# Patient Record
Sex: FEMALE | Race: BLACK OR AFRICAN AMERICAN | NOT HISPANIC OR LATINO | Employment: PART TIME | URBAN - METROPOLITAN AREA
[De-identification: names, ages, dates, MRNs, and addresses within clinical notes are randomized per-mention and may not be internally consistent; named-entity substitution may affect disease eponyms.]

---

## 2017-03-01 ENCOUNTER — OFFICE VISIT (OUTPATIENT)
Dept: OBSTETRICS AND GYNECOLOGY | Facility: CLINIC | Age: 22
End: 2017-03-01
Payer: MEDICAID

## 2017-03-01 VITALS
RESPIRATION RATE: 13 BRPM | BODY MASS INDEX: 25.84 KG/M2 | SYSTOLIC BLOOD PRESSURE: 114 MMHG | HEIGHT: 64 IN | WEIGHT: 151.38 LBS | HEART RATE: 82 BPM | DIASTOLIC BLOOD PRESSURE: 73 MMHG

## 2017-03-01 DIAGNOSIS — Z01.419 WELL WOMAN EXAM WITH ROUTINE GYNECOLOGICAL EXAM: Primary | ICD-10-CM

## 2017-03-01 DIAGNOSIS — Z11.3 SCREEN FOR STD (SEXUALLY TRANSMITTED DISEASE): ICD-10-CM

## 2017-03-01 DIAGNOSIS — R53.83 FATIGUE, UNSPECIFIED TYPE: ICD-10-CM

## 2017-03-01 DIAGNOSIS — Z30.431 IUD SURVEILLANCE: ICD-10-CM

## 2017-03-01 PROCEDURE — 87591 N.GONORRHOEAE DNA AMP PROB: CPT

## 2017-03-01 PROCEDURE — 99999 PR PBB SHADOW E&M-EST. PATIENT-LVL III: CPT | Mod: PBBFAC,,, | Performed by: OBSTETRICS & GYNECOLOGY

## 2017-03-01 PROCEDURE — 99213 OFFICE O/P EST LOW 20 MIN: CPT | Mod: PBBFAC | Performed by: OBSTETRICS & GYNECOLOGY

## 2017-03-01 PROCEDURE — 99395 PREV VISIT EST AGE 18-39: CPT | Mod: S$PBB,,, | Performed by: OBSTETRICS & GYNECOLOGY

## 2017-03-01 NOTE — PROGRESS NOTES
Subjective:    Patient ID: Hailey Menjivar is a 21 y.o. y.o. female.     Chief Complaint: Annual Well Woman Exam     History of Present Illness:  Hailey presents today for Annual Well Woman exam. She describes her menses as rare and light with Mirena IUD x 1 year.She denies pelvic pain.  She denies breast tenderness, masses, nipple discharge. She denies difficulty with urination or bowel movements. She denies vaginal discharge or irritation, She denies bloating, early satiety, or weight changes. She is sexually active. Contraception is by IUD.  She does report increasing fatigue and hair loss.      Menstrual History:   No LMP recorded. Patient has had an implant..     OB History      Para Term  AB TAB SAB Ectopic Multiple Living    0 0 0 0 0 0 0 0 0 0          The following portions of the patient's history were reviewed and updated as appropriate: allergies, current medications, past family history, past medical history, past social history, past surgical history and problem list.    ROS:   CONSTITUTIONAL: fatigue, Negative for fever, chills, diaphoresis, weakness, weight loss, weight gain  ENT: negative for sore throat, nasal congestion, nasal discharge, epistaxis, tinnitus, hearing loss  EYES: negative for blurry vision, decreased vision, loss of vision, eye pain, diplopia, photophobia, discharge  SKIN: Negative for rash, itching, hives  RESPIRATORY: negative for cough, hemoptysis, shortness of breath, pleuritic chest pain, wheezing  CARDIOVASCULAR: negative for chest pain, dyspnea on exertion, orthopnea, paroxysmal nocturnal dyspnea, edema, palpitations  BREAST: negative for breast  tenderness, breast mass, nipple discharge, or skin changes  GASTROINTESTINAL: negative for abdominal pain, flank pain, nausea, vomiting, diarrhea, constipation, black stool, blood in stool  GENITOURINARY: negative for abnormal vaginal bleeding, amenorrhea, decreased libido, dysuria, genital sores, hematuria,  incontinence, menorrhagia, pelvic pain, urinary frequency, vaginal discharge  HEMATOLOGIC/LYMPHATIC: negative for swollen lymph nodes, bleeding, bruising  MUSCULOSKELETAL: negative for back pain, joint pain, joint stiffness, joint swelling, muscle pain, muscle weakness  NEUROLOGICAL: negative for dizzy/vertigo, headache, focal weakness, numbness/tingling, speech problems, loss of consciousness, confusion, memory loss  BEHAVORIAL/PSYCH: negative for anxiety, depression, psychosis  ENDOCRINE: negative for polydipsia/polyuria, palpitations, skin changes, temperature intolerance, unexpected weight changes  ALLERGIC/IMMUNOLOGIC: negative for urticaria, hay fever, angioedema      Objective:    Vital Signs:  Vitals:    03/01/17 1505   BP: 114/73   Pulse: 82   Resp: 13       Physical Exam:  General:  alert, cooperative, appears stated age, no distress   Skin:  Skin color, texture, turgor normal. No rashes or lesions   HEENT:  extra ocular movement intact, sclera clear, anicteric   Neck: supple, trachea midline, no adenopathy or thyromegally   Respiratory:  Normal effort   Breasts:  no discharge, erythema, or tenderness, no skin dimpling or peau d'orange   Abdomen:  soft, nontender, no palpable masses   Pelvis: External genitalia: normal general appearance  Urinary system: urethral meatus normal, bladder nontender  Vaginal: normal mucosa without prolapse or lesions  Cervix: normal appearance, IUD string visualized  Uterus: normal size, shape, position  Adnexa: normal size, nontender bilaterally   Extremities: Normal ROM; no edema, no cyanosis   Neurologial: Normal strength and tone. No focal numbness or weakness   Psychiatric: normal mood, speech, dress, and thought processes         Assessment:       Healthy female exam.     1. Well woman exam with routine gynecological exam    2. Screen for STD (sexually transmitted disease)    3. IUD surveillance    4. Fatigue, unspecified type          Plan:      1. Well woman exam with  routine gynecological exam    2. Screen for STD (sexually transmitted disease)  - C. trachomatis/N. gonorrhoeae by AMP DNA Cervix    3. IUD surveillance  Doing well with IUD; strings visible    4. Fatigue, unspecified type  - TSH; Future      COUNSELING:  Hailey was counseled on STD pevention, use and side-effects of various contraceptive measures, A.C.O.G. Pap guidelines and recommendations for yearly pelvic exams in addition to recommendations for monthly self breast exams; to see her PCP for other health maintenance.

## 2017-03-01 NOTE — MR AVS SNAPSHOT
[facility]  [address] [place] [id number]  Phone: [phone]                  Hailey Menjivar   3/1/2017 3:00 PM   Office Visit    Description:  Female : 1995   Provider:  Harris Mcrae MD   Department:  [facility]           Reason for Visit     Annual Exam     IUD Check           Diagnoses this Visit        Comments    Well woman exam with routine gynecological exam    -  Primary     Screen for STD (sexually transmitted disease)         IUD surveillance         Fatigue, unspecified type                To Do List           Goals (5 Years of Data)     None      Follow-Up and Disposition     Return in about 1 year (around 3/1/2018).      [facility]     [facility] On Call Nurse Care Line -  Assistance  Registered nurses in the [facility] On Call Center provide clinical advisement, health education, appointment booking, and other advisory services.  Call for this free service at [phone].             Medications           Message regarding Medications     Verify the changes and/or additions to your medication regime listed below are the same as discussed with your clinician today.  If any of these changes or additions are incorrect, please notify your healthcare provider.             Verify that the below list of medications is an accurate representation of the medications you are currently taking.  If none reported, the list may be blank. If incorrect, please contact your healthcare provider. Carry this list with you in case of emergency.           Current Medications     levonorgestrel (MIRENA) 20 mcg/24 hr (5 years) IUD 1 each by Intrauterine route once.           Clinical Reference Information           Your Vitals Were     BP                   114/73           Blood Pressure          Most Recent Value    BP  114/73      Allergies as of 3/1/2017     No Known Allergies      Immunizations Administered on Date of Encounter - 3/1/2017     None      Orders  Placed During Today's Visit      Normal Orders This Visit    C. trachomatis/N. gonorrhoeae by AMP DNA Cervix     Future Labs/Procedures Expected by Expires    TSH  3/1/2017 5/30/2017      Language Assistance Services     ATTENTION: Language assistance services are available, free of charge. Please call [phone].      ATENCIÓN: Si habla melania, tiene a farris disposición servicios gratuitos de asistencia lingüística. Llame al [phone].     CHÚ Ý: N?u b?n nói Ti?ng Vi?t, có các d?ch v? h? tr? ngôn ng? mi?n phí dành cho b?n. G?i s? [phone].         [facility] complies with applicable Federal civil rights laws and does not discriminate on the basis of race, color, national origin, age, disability, or sex.

## 2017-03-06 LAB
C TRACH DNA SPEC QL NAA+PROBE: NEGATIVE
N GONORRHOEA DNA SPEC QL NAA+PROBE: NEGATIVE

## 2018-07-27 ENCOUNTER — OFFICE VISIT (OUTPATIENT)
Dept: OBSTETRICS AND GYNECOLOGY | Facility: CLINIC | Age: 23
End: 2018-07-27
Payer: MEDICAID

## 2018-07-27 VITALS
DIASTOLIC BLOOD PRESSURE: 76 MMHG | WEIGHT: 150.44 LBS | RESPIRATION RATE: 13 BRPM | BODY MASS INDEX: 25.68 KG/M2 | HEIGHT: 64 IN | HEART RATE: 81 BPM | SYSTOLIC BLOOD PRESSURE: 122 MMHG

## 2018-07-27 DIAGNOSIS — N76.0 VAGINOSIS: Primary | ICD-10-CM

## 2018-07-27 DIAGNOSIS — Z11.3 SCREEN FOR STD (SEXUALLY TRANSMITTED DISEASE): ICD-10-CM

## 2018-07-27 DIAGNOSIS — Z30.431 IUD CHECK UP: ICD-10-CM

## 2018-07-27 DIAGNOSIS — N64.4 BREAST TENDERNESS IN FEMALE: ICD-10-CM

## 2018-07-27 DIAGNOSIS — Z12.39 ENCOUNTER FOR SCREENING BREAST EXAMINATION: ICD-10-CM

## 2018-07-27 PROCEDURE — 87510 GARDNER VAG DNA DIR PROBE: CPT

## 2018-07-27 PROCEDURE — 99213 OFFICE O/P EST LOW 20 MIN: CPT | Mod: PBBFAC | Performed by: OBSTETRICS & GYNECOLOGY

## 2018-07-27 PROCEDURE — 87480 CANDIDA DNA DIR PROBE: CPT

## 2018-07-27 PROCEDURE — 87491 CHLMYD TRACH DNA AMP PROBE: CPT

## 2018-07-27 PROCEDURE — 99999 PR PBB SHADOW E&M-EST. PATIENT-LVL III: CPT | Mod: PBBFAC,,, | Performed by: OBSTETRICS & GYNECOLOGY

## 2018-07-27 PROCEDURE — 99213 OFFICE O/P EST LOW 20 MIN: CPT | Mod: S$PBB,,, | Performed by: OBSTETRICS & GYNECOLOGY

## 2018-07-27 NOTE — PROGRESS NOTES
Subjective:    Patient ID: Hailey Menjivar is a 22 y.o. y.o. female.     Chief Complaint:   Chief Complaint   Patient presents with    Vaginal Discharge    STD CHECK    IUD Check     pt states her partner is feeling the strings durning intercourse    Breast Pain       History of Present Illness   Hailey presents today complaining of intermittent discharge and odor. Symptoms began about 1 week ago and have off and on.  No LMP recorded. Patient has had an implant.. She also denies abdominal pain. She is sexually active. She requests GC/CT testing.  She says that her partner has complained of pain with the IUD strings.   She also complains of daily bilateral breast tenderness. No masses, nipple discharge, or skin changes.     ROS:   CONSTITUTIONAL: Negative for fever, chills, diaphoresis, weakness, fatigue, weight loss, weight gain  GASTROINTESTINAL: negative for abdominal pain, flank pain, nausea, vomiting, diarrhea, constipation, black stool, blood in stool  BREAST: bilateral breast tenderness, and negative for breast mass, nipple discharge, or skin changes  GENITOURINARY: genital discharge, negative for dysuria, frequency/urgency, hematuria, vaginal bleeding, irregular menses, heavy menses, pelvic pain  HEMATOLOGIC/LYMPHATIC: negative for swollen lymph nodes, bleeding, bruising  MUSCULOSKELETAL: negative for back pain, joint pain, joint stiffness, joint swelling, muscle pain, muscle weakness  ENDOCRINE: negative for polydipsia/polyuria, palpitations, skin changes, temperature intolerance, unexpected weight changes      Objective:    Vital Signs:  Vitals:    07/27/18 1151   BP: 122/76   Pulse: 81   Resp: 13       Physical Exam:  General:  alert, cooperative, no distress   Neuro/Psych: AAOx3, appropriate mood and affect   Head: Normocephalic, atraumatic   Neck: Supple, normal ROM   Resp: Normal effort   Breast: No palpable lesions, normal nipples without discharge, no LAD   Skin:  Skin color, texture, turgor normal.  No rashes or lesions   Abdomen:  soft, NT   Pelvis: External genitalia: normal general appearance  Urinary system: urethral meatus normal, bladder nontender  Vaginal: normal mucosa without prolapse or lesions, discharge, white and scant  Cervix: normal appearance, IUD string visualized, strings trimmed  Uterus: normal size, shape, position  Adnexa: normal size, nontender bilaterally         Assessment:      1. Vaginosis    2. Screen for STD (sexually transmitted disease)    3. IUD check up    4. Encounter for screening breast examination    5. Breast tenderness in female          Plan:      Vaginosis  -     C. trachomatis/N. gonorrhoeae by AMP DNA [facility]; Cervix  -     Vaginosis Screen by DNA Probe    Screen for STD (sexually transmitted disease)  -     C. trachomatis/N. gonorrhoeae by AMP DNA [facility]; Cervix  -     Vaginosis Screen by DNA Probe    IUD check up    Encounter for screening breast examination    Breast tenderness in female      IUD strings trimmed slightly, still about 1.5 cm long from os.  Benign breast exam discussed. Also discussed concerning findings to look for on self breast exam.

## 2018-07-28 LAB
CANDIDA RRNA VAG QL PROBE: NEGATIVE
G VAGINALIS RRNA GENITAL QL PROBE: POSITIVE
T VAGINALIS RRNA GENITAL QL PROBE: NEGATIVE

## 2018-07-29 LAB
C TRACH DNA SPEC QL NAA+PROBE: NOT DETECTED
N GONORRHOEA DNA SPEC QL NAA+PROBE: NOT DETECTED

## 2018-07-30 RX ORDER — METRONIDAZOLE 500 MG/1
500 TABLET ORAL 2 TIMES DAILY
Qty: 14 TABLET | Refills: 0 | Status: SHIPPED | OUTPATIENT
Start: 2018-07-30 | End: 2018-08-06

## 2019-06-26 ENCOUNTER — TELEPHONE (OUTPATIENT)
Dept: OBSTETRICS AND GYNECOLOGY | Facility: CLINIC | Age: 24
End: 2019-06-26

## 2019-06-26 ENCOUNTER — OFFICE VISIT (OUTPATIENT)
Dept: OBSTETRICS AND GYNECOLOGY | Facility: CLINIC | Age: 24
End: 2019-06-26
Payer: MEDICAID

## 2019-06-26 VITALS
BODY MASS INDEX: 23.11 KG/M2 | HEIGHT: 64 IN | HEART RATE: 71 BPM | WEIGHT: 135.38 LBS | DIASTOLIC BLOOD PRESSURE: 66 MMHG | SYSTOLIC BLOOD PRESSURE: 114 MMHG | RESPIRATION RATE: 13 BRPM

## 2019-06-26 DIAGNOSIS — Z12.4 CERVICAL CANCER SCREENING: ICD-10-CM

## 2019-06-26 DIAGNOSIS — Z30.011 ENCOUNTER FOR INITIAL PRESCRIPTION OF CONTRACEPTIVE PILLS: ICD-10-CM

## 2019-06-26 DIAGNOSIS — Z30.432 ENCOUNTER FOR IUD REMOVAL: ICD-10-CM

## 2019-06-26 DIAGNOSIS — K92.1 FRANK BLOOD IN STOOL: ICD-10-CM

## 2019-06-26 DIAGNOSIS — Z01.419 WELL WOMAN EXAM WITH ROUTINE GYNECOLOGICAL EXAM: Primary | ICD-10-CM

## 2019-06-26 PROCEDURE — 58301 REMOVE INTRAUTERINE DEVICE: CPT | Mod: S$PBB,,, | Performed by: OBSTETRICS & GYNECOLOGY

## 2019-06-26 PROCEDURE — 99395 PR PREVENTIVE VISIT,EST,18-39: ICD-10-PCS | Mod: 25,S$PBB,, | Performed by: OBSTETRICS & GYNECOLOGY

## 2019-06-26 PROCEDURE — 58301 REMOVE INTRAUTERINE DEVICE: CPT | Mod: PBBFAC | Performed by: OBSTETRICS & GYNECOLOGY

## 2019-06-26 PROCEDURE — 87624 HPV HI-RISK TYP POOLED RSLT: CPT

## 2019-06-26 PROCEDURE — 88141 LIQUID-BASED PAP SMEAR, SCREENING: ICD-10-PCS | Mod: ,,, | Performed by: PATHOLOGY

## 2019-06-26 PROCEDURE — 99395 PREV VISIT EST AGE 18-39: CPT | Mod: 25,S$PBB,, | Performed by: OBSTETRICS & GYNECOLOGY

## 2019-06-26 PROCEDURE — 88141 CYTOPATH C/V INTERPRET: CPT | Mod: ,,, | Performed by: PATHOLOGY

## 2019-06-26 PROCEDURE — 99999 PR PBB SHADOW E&M-EST. PATIENT-LVL III: CPT | Mod: PBBFAC,,, | Performed by: OBSTETRICS & GYNECOLOGY

## 2019-06-26 PROCEDURE — 88142 CYTOPATH C/V THIN LAYER: CPT | Performed by: PATHOLOGY

## 2019-06-26 PROCEDURE — 99999 PR PBB SHADOW E&M-EST. PATIENT-LVL III: ICD-10-PCS | Mod: PBBFAC,,, | Performed by: OBSTETRICS & GYNECOLOGY

## 2019-06-26 PROCEDURE — 58301 PR REMOVE, INTRAUTERINE DEVICE: ICD-10-PCS | Mod: S$PBB,,, | Performed by: OBSTETRICS & GYNECOLOGY

## 2019-06-26 PROCEDURE — 99213 OFFICE O/P EST LOW 20 MIN: CPT | Mod: PBBFAC | Performed by: OBSTETRICS & GYNECOLOGY

## 2019-06-26 RX ORDER — NORGESTIMATE AND ETHINYL ESTRADIOL 0.25-0.035
1 KIT ORAL DAILY
Qty: 28 TABLET | Refills: 11 | Status: SHIPPED | OUTPATIENT
Start: 2019-06-26 | End: 2019-08-15

## 2019-06-26 NOTE — PROGRESS NOTES
Subjective:    Patient ID: Hailey Menjivar is a 23 y.o. y.o. female.     Chief Complaint: Annual Well Woman Exam     History of Present Illness:  Hailey presents today for Annual Well Woman exam. She describes her menses as irregular with Mirena.  She has had the Mirena for 4 years, but is interested in alternative birth control.She denies pelvic pain.  She denies breast masses, nipple discharge. She denies difficulty with urination.  She reports that she has bleeding with almost every other BM.  This has been occurring for about 1-2 months.  She denies GYN lesions. She denies bloating, early satiety, or weight changes. She is sexually active. Contraception is by IUD; desires removal today.  Would like to start combination OCPs.      Menstrual History:   Patient's last menstrual period was 2019..     OB History    :  0  Para:  0  Term:  0  :  0  :  0  Livin  Spontaneous :  0  Therapeutic :  0  Ectopic:  0  Multiple:  0            The following portions of the patient's history were reviewed and updated as appropriate: allergies, current medications, past family history, past medical history, past social history, past surgical history and problem list.    ROS:   CONSTITUTIONAL: Negative for fever, chills, diaphoresis, weakness, fatigue, weight loss, weight gain  ENT: negative for sore throat, nasal congestion, nasal discharge, epistaxis, tinnitus, hearing loss  EYES: negative for blurry vision, decreased vision, loss of vision, eye pain, diplopia, photophobia, discharge  SKIN: Negative for rash, itching, hives  RESPIRATORY: negative for cough, hemoptysis, shortness of breath, pleuritic chest pain, wheezing  CARDIOVASCULAR: negative for chest pain, dyspnea on exertion, orthopnea, paroxysmal nocturnal dyspnea, edema, palpitations  BREAST: breast  tenderness, and negative for breast mass, nipple discharge, or skin changes  GASTROINTESTINAL: nausea, blood in stool, Denies:  abdominal pain, flank pain, vomiting, constipation  GENITOURINARY: irregular menses, Denies: dysuria, frequency/urgency, hematuria, genital discharge, heavy menses, pelvic pain  HEMATOLOGIC/LYMPHATIC: negative for swollen lymph nodes, bleeding, bruising  MUSCULOSKELETAL: negative for back pain, joint pain, joint stiffness, joint swelling, muscle pain, muscle weakness  NEUROLOGICAL: negative for dizzy/vertigo, headache, focal weakness, numbness/tingling, speech problems, loss of consciousness, confusion, memory loss  BEHAVORIAL/PSYCH: negative for anxiety, depression, psychosis  ENDOCRINE: negative for polydipsia/polyuria, palpitations, skin changes, temperature intolerance, unexpected weight changes  ALLERGIC/IMMUNOLOGIC: negative for urticaria, hay fever, angioedema      Objective:    Vital Signs:  Vitals:    06/26/19 1223   BP: 114/66   Pulse: 71   Resp: 13       Physical Exam:  General:  alert, cooperative, no distress   Skin:  Skin color, texture, turgor normal. No rashes or lesions   HEENT:  extra ocular movement intact, sclera clear, anicteric   Neck: supple, trachea midline, no adenopathy or thyromegally   Respiratory:  Normal effort   Breasts:  symmetric fibrous changes in both upper outer quadrants, no discharge, erythema, tenderness, or palpable masses; no axillary lymphadenopathy   Abdomen:  soft, nontender, no palpable masses   Pelvis: External genitalia: normal general appearance  Urinary system: urethral meatus normal, bladder nontender  Vaginal: normal mucosa without prolapse or lesions  Cervix: normal appearance  Uterus: normal size, shape, position  Adnexa: normal size, nontender bilaterally   Extremities: Normal ROM; no edema, no cyanosis   Neurologial: Normal strength and tone. No focal numbness or weakness.   Psychiatric: normal mood, speech, dress, and thought processes         PROCEDURE:  TIME OUT PERFORMED.  The cervix visualized with a speculum.  The IUD string was NOT visualized.  The string  was not able to be retrieved with the cytobrush.  The cervix was cleaned with betadine.  The IUD hook was used and the string was brought down. The string was grasped with a sponge forceps. With gently traction the IUD was removed without difficulty.          Assessment:       Healthy female exam.     1. Well woman exam with routine gynecological exam    2. Cervical cancer screening    3. Encounter for IUD removal    4. Encounter for initial prescription of contraceptive pills    5. Helio blood in stool          Plan:      Well woman exam with routine gynecological exam    Cervical cancer screening  -     Liquid-based pap smear, screening    Encounter for IUD removal    Encounter for initial prescription of contraceptive pills  -     norgestimate-ethinyl estradiol (ORTHO-CYCLEN) 0.25-35 mg-mcg per tablet; Take 1 tablet by mouth once daily.  Dispense: 28 tablet; Refill: 11    Helio blood in stool  -     Ambulatory consult to Gastroenterology      Manage post IUD removal pain with NSAIDs, Tylenol or Rx given.  Report heavy bleeding, fever, continued pain.  Use alternative birth control until one normal menstrual cycle.    Counseling lasted approximately 15 minutes and all her questions were answered.          COUNSELING:  Hailey was counseled on use and side-effects of various contraceptive measures, A.C.O.G. Pap guidelines and recommendations for yearly pelvic exams in addition to recommendations for monthly self breast exams; to see her PCP for other health maintenance.

## 2019-06-26 NOTE — TELEPHONE ENCOUNTER
[facility] Gastroenterology Dept to review pt file and call to schedule. The doctors are Dr Almita Carrizales, Gaudencio Pearce, and Ge Sahni. Message has been sent to the nurses.

## 2019-06-27 NOTE — TELEPHONE ENCOUNTER
Nurse Angelique Benz returned call. States that Dr will review file and they will call pt to schedule.

## 2019-07-18 LAB
HPV HR 12 DNA CVX QL NAA+PROBE: POSITIVE
HPV16 AG SPEC QL: NEGATIVE
HPV18 DNA SPEC QL NAA+PROBE: NEGATIVE

## 2019-07-22 ENCOUNTER — TELEPHONE (OUTPATIENT)
Dept: OBSTETRICS AND GYNECOLOGY | Facility: CLINIC | Age: 24
End: 2019-07-22

## 2019-07-22 NOTE — TELEPHONE ENCOUNTER
Pt called to discuss results of pap smear results. Dr. Mcrae discussed all pap smear results with patient, also discussed HPV vaccine. Pt instructed to repeat pap smear in 1 year and voiced understanding. Will check to see if she received Gardasil and contact clinic.

## 2019-07-22 NOTE — TELEPHONE ENCOUNTER
----- Message from Gladis Yousif sent at 7/22/2019  2:07 PM CDT -----  Contact: self      ----- Message -----  From: Cecilia Bañuelos MA  Sent: 7/22/2019   1:16 PM  To: Thor MANZO Staff    Hailey Menjivar  MRN: [id number]  Home Phone      [phone]  Work Phone      Not on file.  Mobile          [phone]    Patient Care Team:  Ruperto Willingham MD as PCP - General (Pediatrics)  Harris Mcrae MD as Obstetrician (Obstetrics and Gynecology)  OB? No  What phone number can you be reached at?   [phone]  Message:   Has questions regarding lab work.

## 2019-08-08 ENCOUNTER — OFFICE VISIT (OUTPATIENT)
Dept: OBSTETRICS AND GYNECOLOGY | Facility: CLINIC | Age: 24
End: 2019-08-08
Payer: MEDICAID

## 2019-08-08 ENCOUNTER — LAB VISIT (OUTPATIENT)
Dept: LAB | Facility: HOSPITAL | Age: 24
End: 2019-08-08
Attending: SPECIALIST
Payer: MEDICAID

## 2019-08-08 VITALS
HEIGHT: 64 IN | WEIGHT: 131.81 LBS | BODY MASS INDEX: 22.5 KG/M2 | DIASTOLIC BLOOD PRESSURE: 64 MMHG | SYSTOLIC BLOOD PRESSURE: 118 MMHG

## 2019-08-08 DIAGNOSIS — Z11.3 SCREEN FOR STD (SEXUALLY TRANSMITTED DISEASE): ICD-10-CM

## 2019-08-08 DIAGNOSIS — N91.0 DELAYED MENSES: ICD-10-CM

## 2019-08-08 DIAGNOSIS — Z34.90 EARLY STAGE OF PREGNANCY: ICD-10-CM

## 2019-08-08 DIAGNOSIS — Z12.4 ENCOUNTER FOR PAP SMEAR OF CERVIX WITH HPV DNA COTESTING: Primary | ICD-10-CM

## 2019-08-08 LAB
ABO + RH BLD: NORMAL
B-HCG UR QL: POSITIVE
BASOPHILS # BLD AUTO: 0.05 K/UL (ref 0–0.2)
BASOPHILS NFR BLD: 0.6 % (ref 0–1.9)
BLD GP AB SCN CELLS X3 SERPL QL: NORMAL
CTP QC/QA: YES
DIFFERENTIAL METHOD: NORMAL
EOSINOPHIL # BLD AUTO: 0 K/UL (ref 0–0.5)
EOSINOPHIL NFR BLD: 0.5 % (ref 0–8)
ERYTHROCYTE [DISTWIDTH] IN BLOOD BY AUTOMATED COUNT: 13.2 % (ref 11.5–14.5)
HCT VFR BLD AUTO: 37.1 % (ref 37–48.5)
HGB BLD-MCNC: 12.2 G/DL (ref 12–16)
IMM GRANULOCYTES # BLD AUTO: 0.02 K/UL (ref 0–0.04)
IMM GRANULOCYTES NFR BLD AUTO: 0.2 % (ref 0–0.5)
LYMPHOCYTES # BLD AUTO: 1.6 K/UL (ref 1–4.8)
LYMPHOCYTES NFR BLD: 19.8 % (ref 18–48)
MCH RBC QN AUTO: 28.5 PG (ref 27–31)
MCHC RBC AUTO-ENTMCNC: 32.9 G/DL (ref 32–36)
MCV RBC AUTO: 87 FL (ref 82–98)
MONOCYTES # BLD AUTO: 0.8 K/UL (ref 0.3–1)
MONOCYTES NFR BLD: 9.5 % (ref 4–15)
NEUTROPHILS # BLD AUTO: 5.6 K/UL (ref 1.8–7.7)
NEUTROPHILS NFR BLD: 69.4 % (ref 38–73)
NRBC BLD-RTO: 0 /100 WBC
PLATELET # BLD AUTO: 207 K/UL (ref 150–350)
PMV BLD AUTO: 10.7 FL (ref 9.2–12.9)
RBC # BLD AUTO: 4.28 M/UL (ref 4–5.4)
TSH SERPL DL<=0.005 MIU/L-ACNC: 1.55 UIU/ML (ref 0.4–4)
WBC # BLD AUTO: 8.02 K/UL (ref 3.9–12.7)

## 2019-08-08 PROCEDURE — 86901 BLOOD TYPING SEROLOGIC RH(D): CPT

## 2019-08-08 PROCEDURE — 99205 PR OFFICE/OUTPT VISIT, NEW, LEVL V, 60-74 MIN: ICD-10-PCS | Mod: TH,S$PBB,, | Performed by: SPECIALIST

## 2019-08-08 PROCEDURE — 84443 ASSAY THYROID STIM HORMONE: CPT

## 2019-08-08 PROCEDURE — 36415 COLL VENOUS BLD VENIPUNCTURE: CPT | Mod: PO

## 2019-08-08 PROCEDURE — 99999 PR PBB SHADOW E&M-EST. PATIENT-LVL III: ICD-10-PCS | Mod: PBBFAC,,, | Performed by: SPECIALIST

## 2019-08-08 PROCEDURE — 86703 HIV-1/HIV-2 1 RESULT ANTBDY: CPT

## 2019-08-08 PROCEDURE — 88175 CYTOPATH C/V AUTO FLUID REDO: CPT

## 2019-08-08 PROCEDURE — 85025 COMPLETE CBC W/AUTO DIFF WBC: CPT

## 2019-08-08 PROCEDURE — 87340 HEPATITIS B SURFACE AG IA: CPT

## 2019-08-08 PROCEDURE — 99213 OFFICE O/P EST LOW 20 MIN: CPT | Mod: PBBFAC,TH,PN | Performed by: SPECIALIST

## 2019-08-08 PROCEDURE — 86592 SYPHILIS TEST NON-TREP QUAL: CPT

## 2019-08-08 PROCEDURE — 86762 RUBELLA ANTIBODY: CPT

## 2019-08-08 PROCEDURE — 87491 CHLMYD TRACH DNA AMP PROBE: CPT

## 2019-08-08 PROCEDURE — 99205 OFFICE O/P NEW HI 60 MIN: CPT | Mod: TH,S$PBB,, | Performed by: SPECIALIST

## 2019-08-08 PROCEDURE — 81025 URINE PREGNANCY TEST: CPT | Mod: PBBFAC,PN | Performed by: SPECIALIST

## 2019-08-08 PROCEDURE — 99999 PR PBB SHADOW E&M-EST. PATIENT-LVL III: CPT | Mod: PBBFAC,,, | Performed by: SPECIALIST

## 2019-08-08 PROCEDURE — 81220 CFTR GENE COM VARIANTS: CPT

## 2019-08-08 NOTE — PROGRESS NOTES
22 yo BF G1 presents for initial PNC  LMP approx 6/28.  History reviewed. No pertinent past medical history.    Past Surgical History:   Procedure Laterality Date    TONSILLECTOMY, ADENOIDECTOMY      only adenoids       Family History   Problem Relation Age of Onset    Lupus Maternal Grandmother     Stroke Maternal Grandfather     Diabetes Father     Hypertension Father     Diabetes Mother     Hypertension Mother     Lupus Sister     Diabetes Sister     Breast cancer Neg Hx     Colon cancer Neg Hx     Ovarian cancer Neg Hx        Social History     Socioeconomic History    Marital status: Single     Spouse name: Not on file    Number of children: Not on file    Years of education: Not on file    Highest education level: Not on file   Occupational History    Not on file   Social Needs    Financial resource strain: Not on file    Food insecurity:     Worry: Not on file     Inability: Not on file    Transportation needs:     Medical: Not on file     Non-medical: Not on file   Tobacco Use    Smoking status: Never Smoker    Smokeless tobacco: Never Used   Substance and Sexual Activity    Alcohol use: No    Drug use: No    Sexual activity: Yes     Partners: Male     Birth control/protection: None     Comment: mirena    Lifestyle    Physical activity:     Days per week: Not on file     Minutes per session: Not on file    Stress: Not on file   Relationships    Social connections:     Talks on phone: Not on file     Gets together: Not on file     Attends Voodoo service: Not on file     Active member of club or organization: Not on file     Attends meetings of clubs or organizations: Not on file     Relationship status: Not on file   Other Topics Concern    Not on file   Social History Narrative    Not on file       Current Outpatient Medications   Medication Sig Dispense Refill    norgestimate-ethinyl estradiol (ORTHO-CYCLEN) 0.25-35 mg-mcg per tablet Take 1 tablet by mouth once daily. 28  tablet 11     No current facility-administered medications for this visit.        Review of patient's allergies indicates:  No Known Allergies    Review of System:   General: no chills, fever, night sweats, weight gain or weight loss  Psychological: no depression or suicidal ideation  Breasts: no new or changing breast lumps, nipple discharge or masses.  Respiratory: no cough, shortness of breath, or wheezing  Cardiovascular: no chest pain or dyspnea on exertion  Gastrointestinal: no abdominal pain, change in bowel habits, or black or bloody stools  Genito-Urinary: no incontinence, urinary frequency/urgency or vulvar/vaginal symptoms, pelvic pain or abnormal vaginal bleeding.  Musculoskeletal: no gait disturbance or muscular weakness                                              General Appearance    A and O x 4, Cooperative, no distress   Breasts    Abdomen   Symmetrical, no masses, no discharge, skin changes , erythema or retraction. No adenopathy  Soft, non-tender, bowel sounds active all four quadrants,  no masses, no organomegaly    Genitourinary:   External rectal exam shows no thrombosed external hemorrhoids.   Pelvic exam was performed with patient supine.  No labial fusion.  There is no rash, lesion or injury on the right labia.  There is no rash, lesion or injury on the left labia.  No bleeding and no signs of injury around the vaginal introitus, urethra is without lesions and well supported. The cervix is visualized with no discharge, lesions or friability.  No vaginal discharge found.  No significant Cystocele, Enterocele or rectocele, and uterus well supported.  Bimanual exam:  The urethra is normal to palpation and there are no palpable vaginal wall masses.  Uterus is not deviated, not enlarged, not fixed, normal shape and not tender.  Cervix exhibits no motion tenderness.   Right adnexum displays no mass and no tenderness.  Left adnexum displays no mass and no tenderness.   Extremities: Extremities  normal, atraumatic, no cyanosis or edema                       PAP, GC/CZ submitted    I discussed prenatal care plan  Will obtain PNP and review  RTO 3 weeks for intake u/s

## 2019-08-09 LAB
C TRACH DNA SPEC QL NAA+PROBE: NOT DETECTED
HBV SURFACE AG SERPL QL IA: NEGATIVE
HIV 1+2 AB+HIV1 P24 AG SERPL QL IA: NEGATIVE
N GONORRHOEA DNA SPEC QL NAA+PROBE: NOT DETECTED
RPR SER QL: NORMAL
RUBV IGG SER-ACNC: 78.5 IU/ML
RUBV IGG SER-IMP: REACTIVE

## 2019-08-12 LAB — CFTR MUT ANL BLD/T: NORMAL

## 2019-08-13 ENCOUNTER — TELEPHONE (OUTPATIENT)
Dept: OBSTETRICS AND GYNECOLOGY | Facility: CLINIC | Age: 24
End: 2019-08-13

## 2019-08-13 NOTE — TELEPHONE ENCOUNTER
----- Message from Fidel Perez MD sent at 8/13/2019 11:50 AM CDT -----  Regarding: FW: Lab Client Services  Contact: [phone]      ----- Message -----  From: Merle Tripathi  Sent: 8/13/2019  11:48 AM  To: Fidel Perez MD, Chris SELLERS Staff  Subject: Lab Client Services                              Hi my name is Merle I work in the Lab Client Services. We had a problem with some lab work on this patient. If someone from your office could call us at [phone] or ext [phone] that would be great. Anyone in my department can help. Thank you

## 2019-08-13 NOTE — TELEPHONE ENCOUNTER
The lab called to let you know that the pap smear was done but the HPV can't be due to not enough of a sample.

## 2019-08-15 ENCOUNTER — TELEPHONE (OUTPATIENT)
Dept: OBSTETRICS AND GYNECOLOGY | Facility: CLINIC | Age: 24
End: 2019-08-15

## 2019-08-15 NOTE — TELEPHONE ENCOUNTER
"Patient states has been having some spotting for last couple days, has increased today, patient states on her way to [facility] ED for evaluation for her "own peace of mind"  "

## 2019-08-15 NOTE — TELEPHONE ENCOUNTER
----- Message from Jose Gaitan sent at 8/15/2019  3:27 PM CDT -----  Contact: self   Placed call to pod, patient want to schedule appointment today for bleeding patient said she was on her way to office please call back at [phone]

## 2019-08-20 ENCOUNTER — ROUTINE PRENATAL (OUTPATIENT)
Dept: OBSTETRICS AND GYNECOLOGY | Facility: CLINIC | Age: 24
End: 2019-08-20
Payer: MEDICAID

## 2019-08-20 VITALS
WEIGHT: 131.38 LBS | DIASTOLIC BLOOD PRESSURE: 60 MMHG | BODY MASS INDEX: 23.28 KG/M2 | SYSTOLIC BLOOD PRESSURE: 110 MMHG

## 2019-08-20 DIAGNOSIS — Z3A.01 6 WEEKS GESTATION OF PREGNANCY: Primary | ICD-10-CM

## 2019-08-20 LAB
BILIRUB SERPL-MCNC: NORMAL MG/DL
BLOOD URINE, POC: NORMAL
COLOR, POC UA: YELLOW
GLUCOSE UR QL STRIP: NORMAL
KETONES UR QL STRIP: NORMAL
LEUKOCYTE ESTERASE URINE, POC: NORMAL
NITRITE, POC UA: NORMAL
PH, POC UA: 7
PROTEIN, POC: NORMAL
SPECIFIC GRAVITY, POC UA: NORMAL
UROBILINOGEN, POC UA: NORMAL

## 2019-08-20 PROCEDURE — 76817 TRANSVAGINAL US OBSTETRIC: CPT | Mod: 26,S$PBB,, | Performed by: SPECIALIST

## 2019-08-20 PROCEDURE — 81002 URINALYSIS NONAUTO W/O SCOPE: CPT | Mod: PBBFAC,PN | Performed by: SPECIALIST

## 2019-08-20 PROCEDURE — 76817 PR US, OB, TRANSVAG APPROACH: ICD-10-PCS | Mod: 26,S$PBB,, | Performed by: SPECIALIST

## 2019-08-20 PROCEDURE — 99212 OFFICE O/P EST SF 10 MIN: CPT | Mod: PBBFAC,PN | Performed by: SPECIALIST

## 2019-08-20 PROCEDURE — 99214 OFFICE O/P EST MOD 30 MIN: CPT | Mod: 25,TH,S$PBB, | Performed by: SPECIALIST

## 2019-08-20 PROCEDURE — 99999 PR PBB SHADOW E&M-EST. PATIENT-LVL II: CPT | Mod: PBBFAC,,, | Performed by: SPECIALIST

## 2019-08-20 PROCEDURE — 99214 PR OFFICE/OUTPT VISIT, EST, LEVL IV, 30-39 MIN: ICD-10-PCS | Mod: 25,TH,S$PBB, | Performed by: SPECIALIST

## 2019-08-20 PROCEDURE — 99999 PR PBB SHADOW E&M-EST. PATIENT-LVL II: ICD-10-PCS | Mod: PBBFAC,,, | Performed by: SPECIALIST

## 2019-08-20 PROCEDURE — 76817 TRANSVAGINAL US OBSTETRIC: CPT | Mod: PBBFAC,PN | Performed by: SPECIALIST

## 2019-08-20 NOTE — PROGRESS NOTES
Pt returns for follow up after recent episode of vaginal spotting which has abated  Pt denies cramping, heaving bleeding  Procedure TVS 6.5MHz probe  Positive IUP  CRL 6w  EDC 4/14/20    I discussed bleeding in early pregnancy and discussed bleeding precautions   Will obtain PNP and pt to RTO 4 weeks

## 2019-08-21 ENCOUNTER — TELEPHONE (OUTPATIENT)
Dept: OBSTETRICS AND GYNECOLOGY | Facility: CLINIC | Age: 24
End: 2019-08-21

## 2019-08-21 RX ORDER — PROMETHAZINE HYDROCHLORIDE 25 MG/1
25 TABLET ORAL EVERY 4 HOURS PRN
Qty: 30 TABLET | Refills: 1 | Status: SHIPPED | OUTPATIENT
Start: 2019-08-21 | End: 2019-09-17 | Stop reason: SDUPTHER

## 2019-08-21 NOTE — TELEPHONE ENCOUNTER
----- Message from Adams Manrique sent at 8/21/2019 12:45 PM CDT -----  Contact: pt  Type: Needs Medical Advice    Who Called:  pt    Best Call Back Number: [phone]  Additional Information: pt states she has serve morning sickness and can't keep any food down. Would like to know if something can be called in to the pharmacy.   Pharmacy name and phone #:    [id number] - [place], [place] - [address]  [address] [place] [address] [address]  [address] [address]  Phone: [phone] Fax: [phone]

## 2019-09-03 ENCOUNTER — TELEPHONE (OUTPATIENT)
Dept: OBSTETRICS AND GYNECOLOGY | Facility: CLINIC | Age: 24
End: 2019-09-03

## 2019-09-03 NOTE — TELEPHONE ENCOUNTER
She is calling to let you know that the phenergan is not working, she is still vomiting. She wants to know if there is something else you can prescribe?

## 2019-09-03 NOTE — TELEPHONE ENCOUNTER
----- Message from Nikki Lee sent at 9/3/2019 12:18 PM CDT -----  Contact: self [phone]  She is calling to let you know that the phenergan is not working, she is still vomiting. She wants to know if there is something else you can prescribe?  Please call her.  Thank you!  [facility][id number] - [place], [place] - [address] AT [facility] [address] ([place]  [address] [address]  Phone: [phone] Fax: [phone]

## 2019-09-17 ENCOUNTER — ROUTINE PRENATAL (OUTPATIENT)
Dept: OBSTETRICS AND GYNECOLOGY | Facility: CLINIC | Age: 24
End: 2019-09-17
Payer: MEDICAID

## 2019-09-17 VITALS
BODY MASS INDEX: 22.38 KG/M2 | WEIGHT: 126.31 LBS | SYSTOLIC BLOOD PRESSURE: 112 MMHG | DIASTOLIC BLOOD PRESSURE: 68 MMHG

## 2019-09-17 DIAGNOSIS — Z3A.10 10 WEEKS GESTATION OF PREGNANCY: Primary | ICD-10-CM

## 2019-09-17 LAB
BILIRUB SERPL-MCNC: ABNORMAL MG/DL
BLOOD URINE, POC: ABNORMAL
COLOR, POC UA: ABNORMAL
GLUCOSE UR QL STRIP: ABNORMAL
KETONES UR QL STRIP: ABNORMAL
LEUKOCYTE ESTERASE URINE, POC: ABNORMAL
NITRITE, POC UA: ABNORMAL
PH, POC UA: 7
PROTEIN, POC: ABNORMAL
SPECIFIC GRAVITY, POC UA: ABNORMAL
UROBILINOGEN, POC UA: ABNORMAL

## 2019-09-17 PROCEDURE — 99213 OFFICE O/P EST LOW 20 MIN: CPT | Mod: TH,S$PBB,, | Performed by: SPECIALIST

## 2019-09-17 PROCEDURE — 81002 URINALYSIS NONAUTO W/O SCOPE: CPT | Mod: PBBFAC,PN | Performed by: SPECIALIST

## 2019-09-17 PROCEDURE — 99211 OFF/OP EST MAY X REQ PHY/QHP: CPT | Mod: PBBFAC,PN | Performed by: SPECIALIST

## 2019-09-17 PROCEDURE — 99999 PR PBB SHADOW E&M-EST. PATIENT-LVL I: ICD-10-PCS | Mod: PBBFAC,,, | Performed by: SPECIALIST

## 2019-09-17 PROCEDURE — 99999 PR PBB SHADOW E&M-EST. PATIENT-LVL I: CPT | Mod: PBBFAC,,, | Performed by: SPECIALIST

## 2019-09-17 PROCEDURE — 99213 PR OFFICE/OUTPT VISIT, EST, LEVL III, 20-29 MIN: ICD-10-PCS | Mod: TH,S$PBB,, | Performed by: SPECIALIST

## 2019-09-17 RX ORDER — PROMETHAZINE HYDROCHLORIDE 25 MG/1
25 TABLET ORAL EVERY 4 HOURS PRN
Qty: 30 TABLET | Refills: 1 | Status: SHIPPED | OUTPATIENT
Start: 2019-09-17 | End: 2021-01-19

## 2019-09-17 NOTE — PROGRESS NOTES
Pt returns for care   Continues to have nausea primarily in am. Counseled on avoiding am hypoglycemia  Reviewed lab results  I reviewed pt's past medical history, past and current meds, family history, allergies and reviewed problem list  RTO 4 weeks

## 2020-03-10 PROBLEM — Z03.74 SUSPECTED PROBLEM WITH FETAL GROWTH NOT FOUND: Status: ACTIVE | Noted: 2020-03-10

## 2020-04-08 PROBLEM — R05.9 COUGH: Status: ACTIVE | Noted: 2020-04-08

## 2020-04-08 PROBLEM — O47.9 BRAXTON HICKS' CONTRACTION: Status: ACTIVE | Noted: 2020-04-08

## 2020-04-08 PROBLEM — O47.9 BRAXTON HICKS' CONTRACTION: Status: RESOLVED | Noted: 2020-04-08 | Resolved: 2020-04-08

## 2020-04-29 ENCOUNTER — PATIENT MESSAGE (OUTPATIENT)
Dept: ADMINISTRATIVE | Facility: OTHER | Age: 25
End: 2020-04-29

## 2021-01-14 ENCOUNTER — OFFICE VISIT (OUTPATIENT)
Dept: OBSTETRICS AND GYNECOLOGY | Facility: CLINIC | Age: 26
End: 2021-01-14
Payer: MEDICAID

## 2021-01-14 VITALS
SYSTOLIC BLOOD PRESSURE: 112 MMHG | HEART RATE: 76 BPM | BODY MASS INDEX: 22.44 KG/M2 | HEIGHT: 63 IN | RESPIRATION RATE: 14 BRPM | WEIGHT: 126.63 LBS | DIASTOLIC BLOOD PRESSURE: 70 MMHG

## 2021-01-14 DIAGNOSIS — N76.0 VULVOVAGINITIS: Primary | ICD-10-CM

## 2021-01-14 DIAGNOSIS — Z30.014 ENCOUNTER FOR INITIAL PRESCRIPTION OF INTRAUTERINE CONTRACEPTIVE DEVICE (IUD): ICD-10-CM

## 2021-01-14 PROCEDURE — 87510 GARDNER VAG DNA DIR PROBE: CPT

## 2021-01-14 PROCEDURE — 99999 PR PBB SHADOW E&M-EST. PATIENT-LVL III: ICD-10-PCS | Mod: PBBFAC,,, | Performed by: OBSTETRICS & GYNECOLOGY

## 2021-01-14 PROCEDURE — 99213 PR OFFICE/OUTPT VISIT, EST, LEVL III, 20-29 MIN: ICD-10-PCS | Mod: S$PBB,,, | Performed by: OBSTETRICS & GYNECOLOGY

## 2021-01-14 PROCEDURE — 99213 OFFICE O/P EST LOW 20 MIN: CPT | Mod: S$PBB,,, | Performed by: OBSTETRICS & GYNECOLOGY

## 2021-01-14 PROCEDURE — 99999 PR PBB SHADOW E&M-EST. PATIENT-LVL III: CPT | Mod: PBBFAC,,, | Performed by: OBSTETRICS & GYNECOLOGY

## 2021-01-14 PROCEDURE — 99213 OFFICE O/P EST LOW 20 MIN: CPT | Mod: PBBFAC | Performed by: OBSTETRICS & GYNECOLOGY

## 2021-01-14 PROCEDURE — 87660 TRICHOMONAS VAGIN DIR PROBE: CPT

## 2021-01-19 RX ORDER — METRONIDAZOLE 500 MG/1
500 TABLET ORAL EVERY 12 HOURS
Qty: 14 TABLET | Refills: 0 | Status: SHIPPED | OUTPATIENT
Start: 2021-01-19 | End: 2021-01-26

## 2021-01-22 ENCOUNTER — TELEPHONE (OUTPATIENT)
Dept: OBSTETRICS AND GYNECOLOGY | Facility: CLINIC | Age: 26
End: 2021-01-22

## 2021-01-28 ENCOUNTER — TELEPHONE (OUTPATIENT)
Dept: OBSTETRICS AND GYNECOLOGY | Facility: CLINIC | Age: 26
End: 2021-01-28

## 2021-05-04 ENCOUNTER — PATIENT MESSAGE (OUTPATIENT)
Dept: RESEARCH | Facility: HOSPITAL | Age: 26
End: 2021-05-04

## 2022-01-04 ENCOUNTER — TELEPHONE (OUTPATIENT)
Dept: OBSTETRICS AND GYNECOLOGY | Facility: CLINIC | Age: 27
End: 2022-01-04
Payer: MEDICAID

## 2022-01-04 NOTE — TELEPHONE ENCOUNTER
----- Message from Russell Day LPN sent at 1/4/2022  4:15 PM CST -----    ----- Message -----  From: Chancellor Krishna  Sent: 1/4/2022   2:56 PM CST  To: Thor [facility] Staff    Type:  Needs Medical Advice    Who Called: PT  Would the patient rather a call back or a response via [facility]? Callback   Best Call Back Number: [phone]  Additional Information: Pt requesting callback to set up OB appt

## 2022-01-05 ENCOUNTER — TELEPHONE (OUTPATIENT)
Dept: OBSTETRICS AND GYNECOLOGY | Facility: CLINIC | Age: 27
End: 2022-01-05
Payer: MEDICAID

## 2022-01-05 NOTE — TELEPHONE ENCOUNTER
----- Message from Anisha Pena, Patient Care Assistant sent at 1/4/2022  3:16 PM CST -----  Regarding: appointment  Contact: pt  Type:  Sooner Apoointment Request    Caller is requesting a sooner appointment.  Caller declined first available appointment listed below.  Caller will not accept being placed on the waitlist and is requesting a message be sent to doctor.    Name of Caller:  pt   When is the first available appointment?  1/3/22  Symptoms:  pregnant   Best Call Back Number:  [phone] (home)     Additional Information:  please call pt to advise. Thanks!

## 2022-01-06 ENCOUNTER — INITIAL PRENATAL (OUTPATIENT)
Dept: OBSTETRICS AND GYNECOLOGY | Facility: CLINIC | Age: 27
End: 2022-01-06
Payer: MEDICAID

## 2022-01-06 VITALS
BODY MASS INDEX: 21.36 KG/M2 | DIASTOLIC BLOOD PRESSURE: 70 MMHG | SYSTOLIC BLOOD PRESSURE: 120 MMHG | WEIGHT: 120.56 LBS

## 2022-01-06 DIAGNOSIS — N91.2 ABSENT MENSES: ICD-10-CM

## 2022-01-06 DIAGNOSIS — Z34.90 EARLY STAGE OF PREGNANCY: Primary | ICD-10-CM

## 2022-01-06 LAB
B-HCG UR QL: POSITIVE
CTP QC/QA: YES

## 2022-01-06 PROCEDURE — 99213 OFFICE O/P EST LOW 20 MIN: CPT | Mod: PBBFAC,TH,PN | Performed by: OBSTETRICS & GYNECOLOGY

## 2022-01-06 PROCEDURE — 99999 PR PBB SHADOW E&M-EST. PATIENT-LVL III: CPT | Mod: PBBFAC,,, | Performed by: OBSTETRICS & GYNECOLOGY

## 2022-01-06 PROCEDURE — 81025 URINE PREGNANCY TEST: CPT | Mod: PBBFAC,PN | Performed by: OBSTETRICS & GYNECOLOGY

## 2022-01-06 PROCEDURE — 99213 OFFICE O/P EST LOW 20 MIN: CPT | Mod: TH,S$PBB,, | Performed by: OBSTETRICS & GYNECOLOGY

## 2022-01-06 PROCEDURE — 99213 PR OFFICE/OUTPT VISIT, EST, LEVL III, 20-29 MIN: ICD-10-PCS | Mod: TH,S$PBB,, | Performed by: OBSTETRICS & GYNECOLOGY

## 2022-01-06 PROCEDURE — 99999 PR PBB SHADOW E&M-EST. PATIENT-LVL III: ICD-10-PCS | Mod: PBBFAC,,, | Performed by: OBSTETRICS & GYNECOLOGY

## 2022-01-06 NOTE — PROGRESS NOTES
Subjective:      Hailey Menjivar is being seen today for her first obstetrical visit.  She is at 5.3 gestation by her LMP, but she is unsure of her LMP    Patient's last menstrual period was 2021.    Menstrual History:  OB History        2    Para   1    Term   1       0    AB   0    Living   1       SAB   0    IAB   0    Ectopic   0    Multiple   0    Live Births   1                  The following portions of the patient's history were reviewed and updated as appropriate: allergies, current medications, past family history, past medical history, past social history, past surgical history and problem list.     Review of Systems  Constitutional: negative for chills and fatigue  Gastrointestinal: negative for abdominal pain, constipation, diarrhea, nausea and vomiting  Genitourinary:negative for abnormal menstrual periods, genital lesions and vaginal discharge, dysuria, frequency and hematuria       Objective:      /70   Wt 54.7 kg (120 lb 9.5 oz)   LMP 2021   BMI 21.36 kg/m²   General appearance: alert, appears stated age and cooperative  Abdomen: soft, non-tender; bowel sounds normal; no masses,  no organomegaly  Pelvic: deferred      ULTRASOUND:   Bedside Ultrasound Findings    EXAMINATION:  US PELVIS COMP WITH TRANSVAG OB    CLINICAL HISTORY:  UPT positive    TECHNIQUE:  Transvaginal sonography    COMPARISON:  None    FINDINGS:  1. Uterus:    Appearance: Viable salvador intrauterine pregnancy was not seen, yolk sac was not seen. Gestational Sac 15mm.         Impression      1. Gestational Sac 15mm, No yolk sac or fetal pole      Assessment:      Pregnancy at unknown gestational age. Gestational Sac only seen.      Plan:     Plan for repeat US in two weeks.     Early stage of pregnancy  -     US OB Transvaginal; Future; Expected date: 2022    Absent menses  -     POCT urine pregnancy  -     Cancel: HCG, Quantitative; Future; Expected date: 2022  -     Cancel:  HCG, Quantitative; Future; Expected date: 01/06/2022  -     Cancel: US OB <14 Wks, TransAbd, Single Gestation; Future; Expected date: 01/06/2022        Follow up in 2 weeks.    I reviewed today her blood pressure, weight gain, urine results and  fetal heart rate.  I have reviewed the previous labs and ultrasound with the patient.   I have answered questions to her satisfaction and total of 20 minutes spend today

## 2022-01-06 NOTE — PROCEDURES
Hailey Menjivar is a 26 y.o. female patient.    BP: 120/70 (01/06/22 0820)  Weight: 54.7 kg (120 lb 9.5 oz) (01/06/22 0820)       Procedures     ULTRASOUND:   Bedside Ultrasound Findings    EXAMINATION:  US PELVIS COMP WITH TRANSVAG OB    CLINICAL HISTORY:  UPT positive    TECHNIQUE:  Transvaginal sonography    COMPARISON:  None    FINDINGS:  1. Uterus:    Appearance: Viable salvador intrauterine pregnancy was not seen, yolk sac was not seen. Gestational Sac 15mm.         Impression      1. Gestational Sac 15mm, No yolk sac or fetal pole    1/6/2022

## 2022-01-13 ENCOUNTER — TELEPHONE (OUTPATIENT)
Dept: OBSTETRICS AND GYNECOLOGY | Facility: CLINIC | Age: 27
End: 2022-01-13
Payer: MEDICAID

## 2022-01-13 DIAGNOSIS — O21.9 NAUSEA/VOMITING IN PREGNANCY: Primary | ICD-10-CM

## 2022-01-13 RX ORDER — DOXYLAMINE SUCCINATE 25 MG/1
TABLET ORAL
Qty: 45 TABLET | Refills: 1 | COMMUNITY
Start: 2022-01-13 | End: 2022-03-28

## 2022-01-13 RX ORDER — ONDANSETRON 4 MG/1
4 TABLET, ORALLY DISINTEGRATING ORAL EVERY 6 HOURS PRN
Qty: 30 TABLET | Refills: 1 | Status: SHIPPED | OUTPATIENT
Start: 2022-01-13 | End: 2022-03-07 | Stop reason: SDUPTHER

## 2022-01-13 RX ORDER — PYRIDOXINE HCL (VITAMIN B6) 25 MG
25 TABLET ORAL 3 TIMES DAILY
Qty: 90 TABLET | Refills: 1 | Status: SHIPPED | OUTPATIENT
Start: 2022-01-13 | End: 2022-02-12

## 2022-01-13 RX ORDER — PROMETHAZINE HYDROCHLORIDE 25 MG/1
25 SUPPOSITORY RECTAL EVERY 8 HOURS
Qty: 21 SUPPOSITORY | Refills: 1 | Status: SHIPPED | OUTPATIENT
Start: 2022-01-13 | End: 2022-01-20

## 2022-01-13 NOTE — TELEPHONE ENCOUNTER
----- Message from Eden Osorio sent at 1/13/2022  8:41 AM CST -----  Regarding: advice  Contact: Patient/[phone] (home)  Type: Needs Medical Advice  Who Called:  Patient/[phone] (home)     Symptoms (please be specific):  Vomiting-cannot keep anything down not even water  How long has patient had these symptoms:  morning sickness for 1 week-vomiting 2 days  Pharmacy name and phone #:    [facility] #[id number] - [place], [place] - [address] AT [facility] [address] [facility] [address] ([place] [address])  [address] [address]  Phone: [phone] Fax: [phone]        Additional Information: 6 weeks pregnant    P

## 2022-01-20 ENCOUNTER — HOSPITAL ENCOUNTER (OUTPATIENT)
Dept: RADIOLOGY | Facility: HOSPITAL | Age: 27
Discharge: HOME | End: 2022-01-20
Attending: OBSTETRICS & GYNECOLOGY
Payer: MEDICAID

## 2022-01-20 ENCOUNTER — ROUTINE PRENATAL (OUTPATIENT)
Dept: OBSTETRICS AND GYNECOLOGY | Facility: CLINIC | Age: 27
End: 2022-01-20
Payer: MEDICAID

## 2022-01-20 VITALS
DIASTOLIC BLOOD PRESSURE: 78 MMHG | WEIGHT: 127.63 LBS | BODY MASS INDEX: 22.61 KG/M2 | SYSTOLIC BLOOD PRESSURE: 122 MMHG

## 2022-01-20 DIAGNOSIS — Z3A.01 7 WEEKS GESTATION OF PREGNANCY: ICD-10-CM

## 2022-01-20 DIAGNOSIS — Z34.81 ENCOUNTER FOR SUPERVISION OF OTHER NORMAL PREGNANCY IN FIRST TRIMESTER: Primary | ICD-10-CM

## 2022-01-20 DIAGNOSIS — Z34.90 EARLY STAGE OF PREGNANCY: ICD-10-CM

## 2022-01-20 PROCEDURE — 76817 US OB <14 WEEKS, TRANSABDOM & TRANSVAG, SINGLE GESTATION (XPD): ICD-10-PCS | Mod: 26,,, | Performed by: RADIOLOGY

## 2022-01-20 PROCEDURE — 76801 US OB <14 WEEKS, TRANSABDOM & TRANSVAG, SINGLE GESTATION (XPD): ICD-10-PCS | Mod: 26,,, | Performed by: RADIOLOGY

## 2022-01-20 PROCEDURE — 99999 PR PBB SHADOW E&M-EST. PATIENT-LVL II: ICD-10-PCS | Mod: PBBFAC,,, | Performed by: OBSTETRICS & GYNECOLOGY

## 2022-01-20 PROCEDURE — 99213 OFFICE O/P EST LOW 20 MIN: CPT | Mod: TH,S$PBB,, | Performed by: OBSTETRICS & GYNECOLOGY

## 2022-01-20 PROCEDURE — 76801 OB US < 14 WKS SINGLE FETUS: CPT | Mod: 26,,, | Performed by: RADIOLOGY

## 2022-01-20 PROCEDURE — 99213 PR OFFICE/OUTPT VISIT, EST, LEVL III, 20-29 MIN: ICD-10-PCS | Mod: TH,S$PBB,, | Performed by: OBSTETRICS & GYNECOLOGY

## 2022-01-20 PROCEDURE — 76801 OB US < 14 WKS SINGLE FETUS: CPT | Mod: TC,PN

## 2022-01-20 PROCEDURE — 76817 TRANSVAGINAL US OBSTETRIC: CPT | Mod: 26,,, | Performed by: RADIOLOGY

## 2022-01-20 PROCEDURE — 99999 PR PBB SHADOW E&M-EST. PATIENT-LVL II: CPT | Mod: PBBFAC,,, | Performed by: OBSTETRICS & GYNECOLOGY

## 2022-01-20 PROCEDURE — 99212 OFFICE O/P EST SF 10 MIN: CPT | Mod: PBBFAC,25,TH,PN | Performed by: OBSTETRICS & GYNECOLOGY

## 2022-01-21 ENCOUNTER — PATIENT MESSAGE (OUTPATIENT)
Dept: OBSTETRICS AND GYNECOLOGY | Facility: CLINIC | Age: 27
End: 2022-01-21
Payer: MEDICAID

## 2022-01-27 NOTE — PROGRESS NOTES
Subjective:      Hailey Menjivar is a 26 y.o. female being seen today for her obstetrical visit. She is at 7w3d gestation. Patient reports no complaints. Fetal movement: normal.    Menstrual History:  OB History        2    Para   1    Term   1       0    AB   0    Living   1       SAB   0    IAB   0    Ectopic   0    Multiple   0    Live Births   1                  Patient's last menstrual period was 2021.       OB ROS: no vaginal bleeding, no leakage of fluid, no contractions/cramping, no vaginal discharge. +fetal movement.      Objective:      /78   Wt 57.9 kg (127 lb 10.3 oz)   LMP 2021   BMI 22.61 kg/m²   FHT: 164 BPM   Uterine Size: not measured        Assessment:      Pregnancy 7 and 3/7 weeks     Plan:         7 weeks gestation of pregnancy  -     CBC Auto Differential; Future; Expected date: 2022  -     Type & Screen - Ob Profile; Future; Expected date: 2022  -     Hepatitis B Surface Antigen; Future; Expected date: 2022  -     HEPATITIS C ANTIBODY; Future; Expected date: 2022  -     HIV 1/2 Ag/Ab (4th Gen); Future; Expected date: 2022  -     Rubella Antibody, IgG; Future; Expected date: 2022  -     TSH; Future; Expected date: 2022  -     RPR; Future; Expected date: 2022          Follow up in 4 weeks.       I reviewed today her blood pressure, weight gain, urine results and  fetal heart rate.  I have reviewed the previous labs and ultrasound with the patient.   I have answered questions to her satisfaction and total of 20 minutes spend today      [facility]

## 2022-02-17 ENCOUNTER — LAB VISIT (OUTPATIENT)
Dept: LAB | Facility: HOSPITAL | Age: 27
End: 2022-02-17
Attending: OBSTETRICS & GYNECOLOGY
Payer: MEDICAID

## 2022-02-17 ENCOUNTER — ROUTINE PRENATAL (OUTPATIENT)
Dept: OBSTETRICS AND GYNECOLOGY | Facility: CLINIC | Age: 27
End: 2022-02-17
Payer: MEDICAID

## 2022-02-17 VITALS — BODY MASS INDEX: 21.05 KG/M2 | DIASTOLIC BLOOD PRESSURE: 62 MMHG | WEIGHT: 118.81 LBS | SYSTOLIC BLOOD PRESSURE: 98 MMHG

## 2022-02-17 DIAGNOSIS — Z34.81 ENCOUNTER FOR SUPERVISION OF OTHER NORMAL PREGNANCY IN FIRST TRIMESTER: Primary | ICD-10-CM

## 2022-02-17 DIAGNOSIS — Z3A.11 11 WEEKS GESTATION OF PREGNANCY: ICD-10-CM

## 2022-02-17 LAB
BILIRUB SERPL-MCNC: NORMAL MG/DL
BLOOD URINE, POC: NORMAL
CLARITY, POC UA: NORMAL
COLOR, POC UA: NORMAL
GLUCOSE UR QL STRIP: NORMAL
KETONES UR QL STRIP: NORMAL
LEUKOCYTE ESTERASE URINE, POC: NORMAL
NITRITE, POC UA: NORMAL
PH, POC UA: 8
PROTEIN, POC: NORMAL
SPECIFIC GRAVITY, POC UA: NORMAL
UROBILINOGEN, POC UA: NORMAL

## 2022-02-17 PROCEDURE — 87491 CHLMYD TRACH DNA AMP PROBE: CPT | Performed by: OBSTETRICS & GYNECOLOGY

## 2022-02-17 PROCEDURE — 99213 PR OFFICE/OUTPT VISIT, EST, LEVL III, 20-29 MIN: ICD-10-PCS | Mod: TH,S$PBB,, | Performed by: OBSTETRICS & GYNECOLOGY

## 2022-02-17 PROCEDURE — 99999 PR PBB SHADOW E&M-EST. PATIENT-LVL II: ICD-10-PCS | Mod: PBBFAC,,, | Performed by: OBSTETRICS & GYNECOLOGY

## 2022-02-17 PROCEDURE — 36415 COLL VENOUS BLD VENIPUNCTURE: CPT | Mod: PN | Performed by: OBSTETRICS & GYNECOLOGY

## 2022-02-17 PROCEDURE — 87591 N.GONORRHOEAE DNA AMP PROB: CPT | Performed by: OBSTETRICS & GYNECOLOGY

## 2022-02-17 PROCEDURE — 99213 OFFICE O/P EST LOW 20 MIN: CPT | Mod: TH,S$PBB,, | Performed by: OBSTETRICS & GYNECOLOGY

## 2022-02-17 PROCEDURE — 99999 PR PBB SHADOW E&M-EST. PATIENT-LVL II: CPT | Mod: PBBFAC,,, | Performed by: OBSTETRICS & GYNECOLOGY

## 2022-02-17 PROCEDURE — 99212 OFFICE O/P EST SF 10 MIN: CPT | Mod: PBBFAC,TH,PN | Performed by: OBSTETRICS & GYNECOLOGY

## 2022-02-17 PROCEDURE — 87086 URINE CULTURE/COLONY COUNT: CPT | Performed by: OBSTETRICS & GYNECOLOGY

## 2022-02-17 PROCEDURE — 80307 DRUG TEST PRSMV CHEM ANLYZR: CPT | Performed by: OBSTETRICS & GYNECOLOGY

## 2022-02-17 PROCEDURE — 81002 URINALYSIS NONAUTO W/O SCOPE: CPT | Mod: PBBFAC,PN | Performed by: OBSTETRICS & GYNECOLOGY

## 2022-02-17 NOTE — PROGRESS NOTES
Subjective:      Hailey Menjivar is a 26 y.o. female being seen today for her obstetrical visit. She is at 11w3d gestation. Patient reports no complaints. Fetal movement: normal.    Menstrual History:  OB History        2    Para   1    Term   1       0    AB   0    Living   1       SAB   0    IAB   0    Ectopic   0    Multiple   0    Live Births   1                  Patient's last menstrual period was 2021.       OB ROS: no vaginal bleeding, no leakage of fluid, no contractions/cramping, no vaginal discharge. +fetal movement.      Objective:      BP 98/62   Wt 53.9 kg (118 lb 13.3 oz)   LMP 2021   BMI 21.05 kg/m²   FHT: 166 BPM   Uterine Size: not measured        Assessment:      Pregnancy 11 and 3/7 weeks     Plan:         Encounter for supervision of other normal pregnancy in first trimester  -     C. trachomatis/N. gonorrhoeae by [facility]; Urine  -     Urine culture  -     Toxicology screen, urine  -     POCT URINE DIPSTICK WITHOUT MICROSCOPE  -     Prenatal Miscellaneous Test, Blood; Future; Expected date: 2022    11 weeks gestation of pregnancy          Follow up in 4 weeks.       I reviewed today her blood pressure, weight gain, urine results and  fetal heart rate.  I have reviewed the previous labs and ultrasound with the patient.   I have answered questions to her satisfaction and total of 20 minutes spend today

## 2022-02-18 LAB
AMPHET+METHAMPHET UR QL: NEGATIVE
BARBITURATES UR QL SCN>200 NG/ML: NEGATIVE
BENZODIAZ UR QL SCN>200 NG/ML: NEGATIVE
BZE UR QL SCN: NEGATIVE
CANNABINOIDS UR QL SCN: ABNORMAL
CREAT UR-MCNC: 171 MG/DL (ref 15–325)
ETHANOL UR-MCNC: <10 MG/DL
METHADONE UR QL SCN>300 NG/ML: NEGATIVE
OPIATES UR QL SCN: NEGATIVE
PCP UR QL SCN>25 NG/ML: NEGATIVE
TOXICOLOGY INFORMATION: ABNORMAL

## 2022-02-19 LAB — BACTERIA UR CULT: NO GROWTH

## 2022-02-21 ENCOUNTER — PATIENT MESSAGE (OUTPATIENT)
Dept: ADMINISTRATIVE | Facility: OTHER | Age: 27
End: 2022-02-21
Payer: MEDICAID

## 2022-02-22 LAB
C TRACH DNA SPEC QL NAA+PROBE: NOT DETECTED
N GONORRHOEA DNA SPEC QL NAA+PROBE: NOT DETECTED

## 2022-02-25 ENCOUNTER — PATIENT MESSAGE (OUTPATIENT)
Dept: OBSTETRICS AND GYNECOLOGY | Facility: CLINIC | Age: 27
End: 2022-02-25
Payer: MEDICAID

## 2022-02-28 ENCOUNTER — PATIENT MESSAGE (OUTPATIENT)
Dept: ADMINISTRATIVE | Facility: OTHER | Age: 27
End: 2022-02-28
Payer: MEDICAID

## 2022-03-07 DIAGNOSIS — O21.9 NAUSEA/VOMITING IN PREGNANCY: ICD-10-CM

## 2022-03-07 RX ORDER — ONDANSETRON 4 MG/1
4 TABLET, ORALLY DISINTEGRATING ORAL EVERY 6 HOURS PRN
Qty: 30 TABLET | Refills: 1 | Status: SHIPPED | OUTPATIENT
Start: 2022-03-07 | End: 2022-03-23 | Stop reason: SDUPTHER

## 2022-03-10 ENCOUNTER — PATIENT MESSAGE (OUTPATIENT)
Dept: OBSTETRICS AND GYNECOLOGY | Facility: CLINIC | Age: 27
End: 2022-03-10
Payer: MEDICAID

## 2022-03-23 ENCOUNTER — TELEPHONE (OUTPATIENT)
Dept: OBSTETRICS AND GYNECOLOGY | Facility: CLINIC | Age: 27
End: 2022-03-23
Payer: MEDICAID

## 2022-03-23 DIAGNOSIS — O21.9 NAUSEA/VOMITING IN PREGNANCY: Primary | ICD-10-CM

## 2022-03-23 RX ORDER — ONDANSETRON 4 MG/1
4 TABLET, ORALLY DISINTEGRATING ORAL EVERY 6 HOURS PRN
Qty: 30 TABLET | Refills: 1 | Status: SHIPPED | OUTPATIENT
Start: 2022-03-23 | End: 2022-03-23 | Stop reason: SDUPTHER

## 2022-03-23 NOTE — TELEPHONE ENCOUNTER
----- Message from Prachi Higgins sent at 3/23/2022 10:55 AM CDT -----  Regarding: Medication Refill  Contact: Patient  Type:  RX Refill Request    Who Called: Patient   Refill or New Rx: Refill   RX Name and Strength: ondansetron (ZOFRAN-ODT) 4 MG TbDL  How is the patient currently taking it? (ex. 1XDay):Take 1 tablet (4 mg total) by mouth every 6 (six) hours as needed (nausea). - Oral  Is this a 30 day or 90 day RX: 30 day   Preferred Pharmacy with phone number: [facility] [address] [place] [address]  Phone: [phone]   Local or Mail Order: Local   Ordering Provider: Babak   Would the patient rather a call back or a response via [facility]? #call back   Best Call Back Number: [phone]  Additional Information: Pt stated she was advised to call physician for pharmacy change on prescription please assist

## 2022-03-28 ENCOUNTER — ROUTINE PRENATAL (OUTPATIENT)
Dept: OBSTETRICS AND GYNECOLOGY | Facility: CLINIC | Age: 27
End: 2022-03-28
Payer: MEDICAID

## 2022-03-28 VITALS
WEIGHT: 130.31 LBS | DIASTOLIC BLOOD PRESSURE: 56 MMHG | SYSTOLIC BLOOD PRESSURE: 110 MMHG | BODY MASS INDEX: 23.08 KG/M2

## 2022-03-28 DIAGNOSIS — O21.9 NAUSEA/VOMITING IN PREGNANCY: ICD-10-CM

## 2022-03-28 DIAGNOSIS — Z3A.17 17 WEEKS GESTATION OF PREGNANCY: ICD-10-CM

## 2022-03-28 DIAGNOSIS — Z34.81 ENCOUNTER FOR SUPERVISION OF OTHER NORMAL PREGNANCY IN FIRST TRIMESTER: Primary | ICD-10-CM

## 2022-03-28 LAB
BILIRUB SERPL-MCNC: NEGATIVE MG/DL
BLOOD URINE, POC: NEGATIVE
CLARITY, POC UA: NORMAL
COLOR, POC UA: YELLOW
GLUCOSE UR QL STRIP: NEGATIVE
KETONES UR QL STRIP: NEGATIVE
LEUKOCYTE ESTERASE URINE, POC: NEGATIVE
NITRITE, POC UA: NEGATIVE
PH, POC UA: 8
PROTEIN, POC: NEGATIVE
SPECIFIC GRAVITY, POC UA: NORMAL
UROBILINOGEN, POC UA: NEGATIVE

## 2022-03-28 PROCEDURE — 99999 PR PBB SHADOW E&M-EST. PATIENT-LVL II: CPT | Mod: PBBFAC,,, | Performed by: OBSTETRICS & GYNECOLOGY

## 2022-03-28 PROCEDURE — 99212 OFFICE O/P EST SF 10 MIN: CPT | Mod: PBBFAC,TH,PN | Performed by: OBSTETRICS & GYNECOLOGY

## 2022-03-28 PROCEDURE — 81002 URINALYSIS NONAUTO W/O SCOPE: CPT | Mod: PBBFAC,PN | Performed by: OBSTETRICS & GYNECOLOGY

## 2022-03-28 PROCEDURE — 99213 PR OFFICE/OUTPT VISIT, EST, LEVL III, 20-29 MIN: ICD-10-PCS | Mod: TH,S$PBB,, | Performed by: OBSTETRICS & GYNECOLOGY

## 2022-03-28 PROCEDURE — 99213 OFFICE O/P EST LOW 20 MIN: CPT | Mod: TH,S$PBB,, | Performed by: OBSTETRICS & GYNECOLOGY

## 2022-03-28 PROCEDURE — 99999 PR PBB SHADOW E&M-EST. PATIENT-LVL II: ICD-10-PCS | Mod: PBBFAC,,, | Performed by: OBSTETRICS & GYNECOLOGY

## 2022-03-28 RX ORDER — PROMETHAZINE HYDROCHLORIDE 25 MG/1
SUPPOSITORY RECTAL
Status: ON HOLD | COMMUNITY
Start: 2022-02-25 | End: 2022-08-23

## 2022-03-28 RX ORDER — ASCORBIC ACID, CHOLECALCIFEROL, DL-.ALPHA.-TOCOPHEROL ACETATE, THIAMINE HYDROCHLORIDE, RIBOFLAVIN, NIACINAMIDE, PYRIDOXINE HYDROCHLORIDE, FOLIC ACID, CYANOCOBALAMIN, CALCIUM CARBONATE, FERROUS FUMARATE, ZINC OXIDE, CUPRIC SULFATE 100; 400; 30; 1.6; 1.6; 20; 3.1; 1; 12; 200; 27; 10; 2 MG/1; [IU]/1; [IU]/1; MG/1; MG/1; MG/1; MG/1; MG/1; UG/1; MG/1; MG/1; MG/1; MG/1
1 TABLET, COATED ORAL DAILY
COMMUNITY
Start: 2022-01-24

## 2022-03-28 NOTE — PROGRESS NOTES
Subjective:      Hailey Menjivar is a 26 y.o. female being seen today for her obstetrical visit. She is at 17w0d gestation. Patient reports no complaints. Fetal movement: normal.    Menstrual History:  OB History        2    Para   1    Term   1       0    AB   0    Living   1       SAB   0    IAB   0    Ectopic   0    Multiple   0    Live Births   1                  Patient's last menstrual period was 2021.       OB ROS: no vaginal bleeding, no leakage of fluid, no contractions/cramping, no vaginal discharge. +fetal movement.      Objective:      BP (!) 110/56   Wt 59.1 kg (130 lb 4.7 oz)   LMP 2021   BMI 23.08 kg/m²   FHT: 146 BPM   Uterine Size: not measured        Assessment:      Pregnancy 17 and 0/7 weeks     Plan:     EDC 22 by LMP cw 7wk US  Pap: 2019 NILM, plan fo PP pap  Vaccines: declines Flu  Desires genetic testing: pending  N/V: improved with Zofran  Baby boy  Ped: Dr. Bernstein  Epidural  Breast  Contraception: Mirena, will need to order        Encounter for supervision of other normal pregnancy in first trimester  -     US OB 14+ Wks, TransAbd, Single Gestation; Future; Expected date: 2022    17 weeks gestation of pregnancy  -     POCT URINE DIPSTICK WITHOUT MICROSCOPE    Nausea/vomiting in pregnancy          Follow up in 4 weeks.       I reviewed today her blood pressure, weight gain, urine results and  fetal heart rate.  I have reviewed the previous labs and ultrasound with the patient.   I have answered questions to her satisfaction and total of 20 minutes spend today

## 2022-04-26 ENCOUNTER — HOSPITAL ENCOUNTER (OUTPATIENT)
Dept: RADIOLOGY | Facility: HOSPITAL | Age: 27
Discharge: HOME | End: 2022-04-26
Attending: OBSTETRICS & GYNECOLOGY
Payer: MEDICAID

## 2022-04-26 ENCOUNTER — ROUTINE PRENATAL (OUTPATIENT)
Dept: OBSTETRICS AND GYNECOLOGY | Facility: CLINIC | Age: 27
End: 2022-04-26
Payer: MEDICAID

## 2022-04-26 VITALS
DIASTOLIC BLOOD PRESSURE: 60 MMHG | BODY MASS INDEX: 23.39 KG/M2 | SYSTOLIC BLOOD PRESSURE: 102 MMHG | WEIGHT: 132.06 LBS

## 2022-04-26 DIAGNOSIS — O21.9 NAUSEA/VOMITING IN PREGNANCY: ICD-10-CM

## 2022-04-26 DIAGNOSIS — Z3A.21 21 WEEKS GESTATION OF PREGNANCY: ICD-10-CM

## 2022-04-26 DIAGNOSIS — Z34.81 ENCOUNTER FOR SUPERVISION OF OTHER NORMAL PREGNANCY IN FIRST TRIMESTER: ICD-10-CM

## 2022-04-26 DIAGNOSIS — Z34.81 ENCOUNTER FOR SUPERVISION OF OTHER NORMAL PREGNANCY IN FIRST TRIMESTER: Primary | ICD-10-CM

## 2022-04-26 PROCEDURE — 99213 PR OFFICE/OUTPT VISIT, EST, LEVL III, 20-29 MIN: ICD-10-PCS | Mod: TH,S$PBB,, | Performed by: OBSTETRICS & GYNECOLOGY

## 2022-04-26 PROCEDURE — 99213 OFFICE O/P EST LOW 20 MIN: CPT | Mod: TH,S$PBB,, | Performed by: OBSTETRICS & GYNECOLOGY

## 2022-04-26 PROCEDURE — 76805 US OB 14+ WEEKS, TRANSABDOM, SINGLE GESTATION: ICD-10-PCS | Mod: 26,,, | Performed by: RADIOLOGY

## 2022-04-26 PROCEDURE — 99999 PR PBB SHADOW E&M-EST. PATIENT-LVL II: CPT | Mod: PBBFAC,,, | Performed by: OBSTETRICS & GYNECOLOGY

## 2022-04-26 PROCEDURE — 99212 OFFICE O/P EST SF 10 MIN: CPT | Mod: PBBFAC,25,TH,PN | Performed by: OBSTETRICS & GYNECOLOGY

## 2022-04-26 PROCEDURE — 99999 PR PBB SHADOW E&M-EST. PATIENT-LVL II: ICD-10-PCS | Mod: PBBFAC,,, | Performed by: OBSTETRICS & GYNECOLOGY

## 2022-04-26 PROCEDURE — 76805 OB US >/= 14 WKS SNGL FETUS: CPT | Mod: 26,,, | Performed by: RADIOLOGY

## 2022-04-26 PROCEDURE — 76805 OB US >/= 14 WKS SNGL FETUS: CPT | Mod: TC,PN

## 2022-04-26 RX ORDER — ONDANSETRON 4 MG/1
4 TABLET, ORALLY DISINTEGRATING ORAL EVERY 6 HOURS PRN
Qty: 30 TABLET | Refills: 3 | Status: ON HOLD | OUTPATIENT
Start: 2022-04-26 | End: 2022-08-23

## 2022-04-26 NOTE — PROGRESS NOTES
Subjective:      Hailey Menjivar is a 26 y.o. female being seen today for her obstetrical visit. She is at 21w1d gestation. Patient reports no complaints. Fetal movement: normal.    Menstrual History:  OB History        2    Para   1    Term   1       0    AB   0    Living   1       SAB   0    IAB   0    Ectopic   0    Multiple   0    Live Births   1                  Patient's last menstrual period was 2021.       OB ROS: no vaginal bleeding, no leakage of fluid, no contractions/cramping, no vaginal discharge. +fetal movement.      Objective:      /60   Wt 59.9 kg (132 lb 0.9 oz)   LMP 2021   BMI 23.39 kg/m²   FHT: 143 BPM   Uterine Size: not measured        Assessment:      Pregnancy 21 and 1/7 weeks     Plan:     EDC 22 by LMP cw 7wk US  Pap: 2019 NILM, plan fo PP pap  Vaccines: declines Flu  Desires genetic testing: pending, boy  N/V: improved with Zofran  Ped: Dr. Bernstein  Epidural  Breast  Contraception: Mirena, will need to order      Encounter for supervision of other normal pregnancy in first trimester  -     OB Glucose Screen; Future; Expected date: 2022    21 weeks gestation of pregnancy  -     POCT URINE DIPSTICK WITHOUT MICROSCOPE          Follow up in 4 weeks.       I reviewed today her blood pressure, weight gain, urine results and  fetal heart rate.  I have reviewed the previous labs and ultrasound with the patient.   I have answered questions to her satisfaction and total of 20 minutes spend today

## 2022-05-23 ENCOUNTER — PATIENT MESSAGE (OUTPATIENT)
Dept: ADMINISTRATIVE | Facility: OTHER | Age: 27
End: 2022-05-23
Payer: MEDICAID

## 2022-05-24 ENCOUNTER — TELEPHONE (OUTPATIENT)
Dept: OBSTETRICS AND GYNECOLOGY | Facility: CLINIC | Age: 27
End: 2022-05-24
Payer: MEDICAID

## 2022-05-24 NOTE — TELEPHONE ENCOUNTER
----- Message from Sid Khan sent at 5/24/2022  8:36 AM CDT -----  Type:  Sooner Appointment Request    Caller is requesting a sooner appointment.  Caller declined first available appointment listed below.  Caller will not accept being placed on the waitlist and is requesting a message be sent to doctor.    Name of Caller: Patient  When is the first available appointment?  Out of Template  Symptoms:  OB visit and Fasting labs  Best Call Back Number:  [phone]  Additional Information:

## 2022-05-27 ENCOUNTER — TELEPHONE (OUTPATIENT)
Dept: OBSTETRICS AND GYNECOLOGY | Facility: CLINIC | Age: 27
End: 2022-05-27
Payer: MEDICAID

## 2022-05-27 NOTE — TELEPHONE ENCOUNTER
----- Message from Lacie Gan sent at 5/26/2022  8:01 AM CDT -----  Contact: pt  Type: Needs Medical Advice    Who: Called: pt   Best Call Back Number: [phone]    Inquiry/Question: pt needs to reschedule her appts for today I tried and could not reschedule please advise  Thank you~

## 2022-06-01 ENCOUNTER — LAB VISIT (OUTPATIENT)
Dept: LAB | Facility: HOSPITAL | Age: 27
End: 2022-06-01
Attending: OBSTETRICS & GYNECOLOGY
Payer: MEDICAID

## 2022-06-01 ENCOUNTER — ROUTINE PRENATAL (OUTPATIENT)
Dept: OBSTETRICS AND GYNECOLOGY | Facility: CLINIC | Age: 27
End: 2022-06-01
Payer: MEDICAID

## 2022-06-01 VITALS
SYSTOLIC BLOOD PRESSURE: 102 MMHG | WEIGHT: 143.31 LBS | BODY MASS INDEX: 25.38 KG/M2 | DIASTOLIC BLOOD PRESSURE: 58 MMHG

## 2022-06-01 DIAGNOSIS — Z3A.26 26 WEEKS GESTATION OF PREGNANCY: ICD-10-CM

## 2022-06-01 DIAGNOSIS — Z34.81 ENCOUNTER FOR SUPERVISION OF OTHER NORMAL PREGNANCY IN FIRST TRIMESTER: ICD-10-CM

## 2022-06-01 DIAGNOSIS — Z34.82 ENCOUNTER FOR SUPERVISION OF OTHER NORMAL PREGNANCY IN SECOND TRIMESTER: Primary | ICD-10-CM

## 2022-06-01 LAB
BILIRUB SERPL-MCNC: NORMAL MG/DL
BLOOD URINE, POC: NORMAL
CLARITY, POC UA: NORMAL
COLOR, POC UA: NORMAL
GLUCOSE SERPL-MCNC: 77 MG/DL (ref 70–140)
GLUCOSE UR QL STRIP: NORMAL
KETONES UR QL STRIP: NORMAL
LEUKOCYTE ESTERASE URINE, POC: NORMAL
NITRITE, POC UA: NORMAL
PH, POC UA: 9
PROTEIN, POC: NORMAL
SPECIFIC GRAVITY, POC UA: NORMAL
UROBILINOGEN, POC UA: NORMAL

## 2022-06-01 PROCEDURE — 99212 OFFICE O/P EST SF 10 MIN: CPT | Mod: PBBFAC,TH,PN | Performed by: OBSTETRICS & GYNECOLOGY

## 2022-06-01 PROCEDURE — 99213 OFFICE O/P EST LOW 20 MIN: CPT | Mod: TH,S$PBB,, | Performed by: OBSTETRICS & GYNECOLOGY

## 2022-06-01 PROCEDURE — 82950 GLUCOSE TEST: CPT | Performed by: OBSTETRICS & GYNECOLOGY

## 2022-06-01 PROCEDURE — 81002 URINALYSIS NONAUTO W/O SCOPE: CPT | Mod: PBBFAC,PN | Performed by: OBSTETRICS & GYNECOLOGY

## 2022-06-01 PROCEDURE — 99213 PR OFFICE/OUTPT VISIT, EST, LEVL III, 20-29 MIN: ICD-10-PCS | Mod: TH,S$PBB,, | Performed by: OBSTETRICS & GYNECOLOGY

## 2022-06-01 PROCEDURE — 99999 PR PBB SHADOW E&M-EST. PATIENT-LVL II: ICD-10-PCS | Mod: PBBFAC,,, | Performed by: OBSTETRICS & GYNECOLOGY

## 2022-06-01 PROCEDURE — 99999 PR PBB SHADOW E&M-EST. PATIENT-LVL II: CPT | Mod: PBBFAC,,, | Performed by: OBSTETRICS & GYNECOLOGY

## 2022-06-01 PROCEDURE — 36415 COLL VENOUS BLD VENIPUNCTURE: CPT | Mod: PN | Performed by: OBSTETRICS & GYNECOLOGY

## 2022-06-01 NOTE — PROGRESS NOTES
Subjective:      Hailey Menjivar is a 26 y.o. female being seen today for her obstetrical visit. She is at 26w2d gestation. Patient reports no complaints. Fetal movement: normal.    Menstrual History:  OB History        2    Para   1    Term   1       0    AB   0    Living   1       SAB   0    IAB   0    Ectopic   0    Multiple   0    Live Births   1                  Patient's last menstrual period was 2021.       OB ROS: no vaginal bleeding, no leakage of fluid, no contractions/cramping, no vaginal discharge. +fetal movement.      Objective:      BP (!) 102/58   Wt 65 kg (143 lb 4.8 oz)   LMP 2021   BMI 25.38 kg/m²   FHT: 136 BPM   Uterine Size: size equals dates        Assessment:      Pregnancy 26 and 2/7 weeks     Plan:     EDC 22 by LMP cw 7wk US  Pap: 2019 NILM, plan fo PP pap  Vaccines: declines Flu  Desires genetic testing: pending, boy  N/V: improved with Zofran  Ped: Dr. Bernstein  Epidural  Breast  Contraception: Mirena, will need to order      26 weeks gestation of pregnancy  -     POCT URINE DIPSTICK WITHOUT MICROSCOPE          Follow up in 4 weeks.       I reviewed today her blood pressure, weight gain, urine results and  fetal heart rate.  I have reviewed the previous labs and ultrasound with the patient.   I have answered questions to her satisfaction and total of 20 minutes spend today

## 2022-06-30 ENCOUNTER — TELEPHONE (OUTPATIENT)
Dept: OBSTETRICS AND GYNECOLOGY | Facility: CLINIC | Age: 27
End: 2022-06-30
Payer: MEDICAID

## 2022-06-30 NOTE — TELEPHONE ENCOUNTER
----- Message from Sarah Cuellar MA sent at 6/30/2022 11:10 AM CDT -----  Regarding: FW: appt access    ----- Message -----  From: Olivia Griggs  Sent: 6/30/2022  11:03 AM CDT  To: [facility] Staff  Subject: appt access                                      Type: Needs Medical Advice    Who Called:      Best Call Back Number:   Additional Information: Requesting a call back from Nurse, regarding miss appt and wants to be seen next week ,please advise and call back

## 2022-07-08 ENCOUNTER — ROUTINE PRENATAL (OUTPATIENT)
Dept: OBSTETRICS AND GYNECOLOGY | Facility: CLINIC | Age: 27
End: 2022-07-08
Payer: MEDICAID

## 2022-07-08 VITALS — WEIGHT: 138 LBS | SYSTOLIC BLOOD PRESSURE: 118 MMHG | DIASTOLIC BLOOD PRESSURE: 62 MMHG | BODY MASS INDEX: 24.45 KG/M2

## 2022-07-08 DIAGNOSIS — Z3A.31 31 WEEKS GESTATION OF PREGNANCY: Primary | ICD-10-CM

## 2022-07-08 LAB
BILIRUB SERPL-MCNC: NEGATIVE MG/DL
BLOOD URINE, POC: NEGATIVE
CLARITY, POC UA: CLEAR
COLOR, POC UA: YELLOW
GLUCOSE UR QL STRIP: NORMAL
KETONES UR QL STRIP: NEGATIVE
LEUKOCYTE ESTERASE URINE, POC: NEGATIVE
NITRITE, POC UA: NEGATIVE
PH, POC UA: 7
PROTEIN, POC: NEGATIVE
SPECIFIC GRAVITY, POC UA: NORMAL
UROBILINOGEN, POC UA: NORMAL

## 2022-07-08 PROCEDURE — 99212 OFFICE O/P EST SF 10 MIN: CPT | Mod: PBBFAC,PN | Performed by: STUDENT IN AN ORGANIZED HEALTH CARE EDUCATION/TRAINING PROGRAM

## 2022-07-08 PROCEDURE — 90715 TDAP VACCINE 7 YRS/> IM: CPT | Mod: PBBFAC,PN

## 2022-07-08 PROCEDURE — 99999 PR PBB SHADOW E&M-EST. PATIENT-LVL II: ICD-10-PCS | Mod: PBBFAC,,, | Performed by: STUDENT IN AN ORGANIZED HEALTH CARE EDUCATION/TRAINING PROGRAM

## 2022-07-08 PROCEDURE — 81002 URINALYSIS NONAUTO W/O SCOPE: CPT | Mod: PBBFAC,PN | Performed by: STUDENT IN AN ORGANIZED HEALTH CARE EDUCATION/TRAINING PROGRAM

## 2022-07-08 PROCEDURE — 99213 PR OFFICE/OUTPT VISIT, EST, LEVL III, 20-29 MIN: ICD-10-PCS | Mod: S$PBB,TH,, | Performed by: STUDENT IN AN ORGANIZED HEALTH CARE EDUCATION/TRAINING PROGRAM

## 2022-07-08 PROCEDURE — 99999 PR PBB SHADOW E&M-EST. PATIENT-LVL II: CPT | Mod: PBBFAC,,, | Performed by: STUDENT IN AN ORGANIZED HEALTH CARE EDUCATION/TRAINING PROGRAM

## 2022-07-08 PROCEDURE — 99213 OFFICE O/P EST LOW 20 MIN: CPT | Mod: S$PBB,TH,, | Performed by: STUDENT IN AN ORGANIZED HEALTH CARE EDUCATION/TRAINING PROGRAM

## 2022-07-08 NOTE — PROGRESS NOTES
Complaints today:None, Good fetal movements reported,No Bleeding or pains    /62   Wt 62.6 kg (138 lb 0.1 oz)   LMP 2021   BMI 24.45 kg/m²     26 y.o., at 31w4d by Estimated Date of Delivery: 22  Patient Active Problem List   Diagnosis    History of gonococcal infection    History of chlamydia infection    Suspected problem with fetal growth not found    [place]' contraction    Pyrexia of unknown origin following delivery    Cough     OB History    Para Term  AB Living   2 1 1 0 0 1   SAB IAB Ectopic Multiple Live Births   0 0 0 0 1      # Outcome Date GA Lbr Ac/2nd Weight Sex Delivery Anes PTL Lv   2 Current            1 Term 20 39w1d  3.031 kg (6 lb 10.9 oz) M Vag-Spont Gen N VIJAY       Dating reviewed    Allergies and problem list reviewed and updated    Medical and surgical history reviewed    Prenatal labs reviewed and updated    Physical Exam:  ABD: soft, gravid, nontender,       Assessment:  Hailey was seen today for routine prenatal visit.    Diagnoses and all orders for this visit:    31 weeks gestation of pregnancy  -     POCT URINE DIPSTICK WITHOUT MICROSCOPE          Plan:   - TDAP today  - growth US today    follow up 2Weeks, bleeding/pain precautions, kick counts, labor precautions given     Estela Hardwick M.D.  Obstetrics and Gynecology

## 2022-07-11 ENCOUNTER — PATIENT MESSAGE (OUTPATIENT)
Dept: ADMINISTRATIVE | Facility: OTHER | Age: 27
End: 2022-07-11
Payer: MEDICAID

## 2022-07-20 ENCOUNTER — ROUTINE PRENATAL (OUTPATIENT)
Dept: OBSTETRICS AND GYNECOLOGY | Facility: CLINIC | Age: 27
End: 2022-07-20
Payer: MEDICAID

## 2022-07-20 ENCOUNTER — HOSPITAL ENCOUNTER (OUTPATIENT)
Dept: RADIOLOGY | Facility: HOSPITAL | Age: 27
Discharge: HOME | End: 2022-07-20
Attending: STUDENT IN AN ORGANIZED HEALTH CARE EDUCATION/TRAINING PROGRAM
Payer: MEDICAID

## 2022-07-20 VITALS
DIASTOLIC BLOOD PRESSURE: 52 MMHG | BODY MASS INDEX: 25.38 KG/M2 | WEIGHT: 143.31 LBS | SYSTOLIC BLOOD PRESSURE: 118 MMHG

## 2022-07-20 DIAGNOSIS — Z30.9 ENCOUNTER FOR CONTRACEPTIVE MANAGEMENT, UNSPECIFIED TYPE: ICD-10-CM

## 2022-07-20 DIAGNOSIS — Z3A.33 33 WEEKS GESTATION OF PREGNANCY: ICD-10-CM

## 2022-07-20 DIAGNOSIS — Z34.83 ENCOUNTER FOR SUPERVISION OF OTHER NORMAL PREGNANCY IN THIRD TRIMESTER: Primary | ICD-10-CM

## 2022-07-20 DIAGNOSIS — Z3A.31 31 WEEKS GESTATION OF PREGNANCY: ICD-10-CM

## 2022-07-20 LAB
BILIRUB SERPL-MCNC: NORMAL MG/DL
BLOOD URINE, POC: NORMAL
CLARITY, POC UA: NORMAL
COLOR, POC UA: YELLOW
GLUCOSE UR QL STRIP: NORMAL
KETONES UR QL STRIP: NORMAL
LEUKOCYTE ESTERASE URINE, POC: NORMAL
NITRITE, POC UA: NORMAL
PH, POC UA: 9
PROTEIN, POC: NORMAL
SPECIFIC GRAVITY, POC UA: 1
UROBILINOGEN, POC UA: NORMAL

## 2022-07-20 PROCEDURE — 81002 URINALYSIS NONAUTO W/O SCOPE: CPT | Mod: PBBFAC,PN | Performed by: OBSTETRICS & GYNECOLOGY

## 2022-07-20 PROCEDURE — 99999 PR PBB SHADOW E&M-EST. PATIENT-LVL II: CPT | Mod: PBBFAC,,, | Performed by: OBSTETRICS & GYNECOLOGY

## 2022-07-20 PROCEDURE — 76816 OB US FOLLOW-UP PER FETUS: CPT | Mod: TC,PN

## 2022-07-20 PROCEDURE — 99999 PR PBB SHADOW E&M-EST. PATIENT-LVL II: ICD-10-PCS | Mod: PBBFAC,,, | Performed by: OBSTETRICS & GYNECOLOGY

## 2022-07-20 PROCEDURE — 99212 OFFICE O/P EST SF 10 MIN: CPT | Mod: PBBFAC,TH,PN | Performed by: OBSTETRICS & GYNECOLOGY

## 2022-07-20 PROCEDURE — 99213 OFFICE O/P EST LOW 20 MIN: CPT | Mod: TH,S$PBB,, | Performed by: OBSTETRICS & GYNECOLOGY

## 2022-07-20 PROCEDURE — 76816 OB US FOLLOW-UP PER FETUS: CPT | Mod: 26,,, | Performed by: RADIOLOGY

## 2022-07-20 PROCEDURE — 76816 US OB FOLLOW UP EA GESTATION TRANSABDOMINAL: ICD-10-PCS | Mod: 26,,, | Performed by: RADIOLOGY

## 2022-07-20 PROCEDURE — 99213 PR OFFICE/OUTPT VISIT, EST, LEVL III, 20-29 MIN: ICD-10-PCS | Mod: TH,S$PBB,, | Performed by: OBSTETRICS & GYNECOLOGY

## 2022-07-20 RX ORDER — DICLOFENAC SODIUM 75 MG/1
TABLET, DELAYED RELEASE ORAL
COMMUNITY
Start: 2021-10-30 | End: 2022-07-20

## 2022-07-20 NOTE — Clinical Note
Please make all her OB FU visits until delivery. She will need a visit every two weeks until 36 weeks. Then a visit weekly until 40 weeks. She will need a growth US at 36 weeks.

## 2022-07-20 NOTE — PROGRESS NOTES
Subjective:      Hailey Menjivar is a 26 y.o. female being seen today for her obstetrical visit. She is at 33w2d gestation. Patient reports no complaints. Fetal movement: normal.    Menstrual History:  OB History        2    Para   1    Term   1       0    AB   0    Living   1       SAB   0    IAB   0    Ectopic   0    Multiple   0    Live Births   1                  Patient's last menstrual period was 2021.       OB ROS: no vaginal bleeding, no leakage of fluid, no contractions/cramping, no vaginal discharge. +fetal movement.      Objective:      BP (!) 118/52   Wt 65 kg (143 lb 4.8 oz)   LMP 2021   BMI 25.38 kg/m²   FHT: 141 BPM   Uterine Size: size equals dates        Assessment:      Pregnancy 33 and 2/7 weeks     Plan:     EDC 22 by LMP cw 7wk US  Growth 33wk 43% wnl  Pap: 2019 NILM, plan fo PP pap  Vaccines: declines Flu, Tdap 22, No COVID  Desires genetic test wnl, boy  Ped: Dr. Bernstein  Epidural  Breast  Contraception: DepoProvera vs Mirena, ordered 22    Encounter for supervision of other normal pregnancy in third trimester    33 weeks gestation of pregnancy          Follow up in 4 weeks.       I reviewed today her blood pressure, weight gain, urine results and  fetal heart rate.  I have reviewed the previous labs and ultrasound with the patient.   I have answered questions to her satisfaction and total of 20 minutes spend today

## 2022-07-21 DIAGNOSIS — Z3A.36 36 WEEKS GESTATION OF PREGNANCY: Primary | ICD-10-CM

## 2022-08-09 ENCOUNTER — HOSPITAL ENCOUNTER (OUTPATIENT)
Dept: RADIOLOGY | Facility: HOSPITAL | Age: 27
Discharge: HOME | End: 2022-08-09
Attending: OBSTETRICS & GYNECOLOGY
Payer: MEDICAID

## 2022-08-09 ENCOUNTER — ROUTINE PRENATAL (OUTPATIENT)
Dept: OBSTETRICS AND GYNECOLOGY | Facility: CLINIC | Age: 27
End: 2022-08-09
Payer: MEDICAID

## 2022-08-09 VITALS — BODY MASS INDEX: 25.42 KG/M2 | DIASTOLIC BLOOD PRESSURE: 62 MMHG | WEIGHT: 143.5 LBS | SYSTOLIC BLOOD PRESSURE: 110 MMHG

## 2022-08-09 DIAGNOSIS — Z34.83 ENCOUNTER FOR SUPERVISION OF OTHER NORMAL PREGNANCY IN THIRD TRIMESTER: Primary | ICD-10-CM

## 2022-08-09 DIAGNOSIS — Z3A.36 36 WEEKS GESTATION OF PREGNANCY: ICD-10-CM

## 2022-08-09 PROCEDURE — 81002 URINALYSIS NONAUTO W/O SCOPE: CPT | Mod: PBBFAC,PN | Performed by: OBSTETRICS & GYNECOLOGY

## 2022-08-09 PROCEDURE — 99213 OFFICE O/P EST LOW 20 MIN: CPT | Mod: TH,S$PBB,, | Performed by: OBSTETRICS & GYNECOLOGY

## 2022-08-09 PROCEDURE — 76815 OB US LIMITED FETUS(S): CPT | Mod: TC,PN

## 2022-08-09 PROCEDURE — 99999 PR PBB SHADOW E&M-EST. PATIENT-LVL II: ICD-10-PCS | Mod: PBBFAC,,, | Performed by: OBSTETRICS & GYNECOLOGY

## 2022-08-09 PROCEDURE — 99999 PR PBB SHADOW E&M-EST. PATIENT-LVL II: CPT | Mod: PBBFAC,,, | Performed by: OBSTETRICS & GYNECOLOGY

## 2022-08-09 PROCEDURE — 76815 US OB LIMITED 1 OR MORE GESTATIONS: ICD-10-PCS | Mod: 26,,, | Performed by: RADIOLOGY

## 2022-08-09 PROCEDURE — 76815 OB US LIMITED FETUS(S): CPT | Mod: 26,,, | Performed by: RADIOLOGY

## 2022-08-09 PROCEDURE — 99213 PR OFFICE/OUTPT VISIT, EST, LEVL III, 20-29 MIN: ICD-10-PCS | Mod: TH,S$PBB,, | Performed by: OBSTETRICS & GYNECOLOGY

## 2022-08-09 PROCEDURE — 87081 CULTURE SCREEN ONLY: CPT | Performed by: OBSTETRICS & GYNECOLOGY

## 2022-08-09 PROCEDURE — 99212 OFFICE O/P EST SF 10 MIN: CPT | Mod: PBBFAC,TH,PN | Performed by: OBSTETRICS & GYNECOLOGY

## 2022-08-09 NOTE — PROGRESS NOTES
Subjective:      Hailey Menjivar is a 26 y.o. female being seen today for her obstetrical visit. She is at 36w1d gestation. Patient reports no complaints. Fetal movement: normal.    Menstrual History:  OB History        2    Para   1    Term   1       0    AB   0    Living   1       SAB   0    IAB   0    Ectopic   0    Multiple   0    Live Births   1                  Patient's last menstrual period was 2021.       OB ROS: no vaginal bleeding, no leakage of fluid, no contractions/cramping, no vaginal discharge. +fetal movement.      Objective:      /62   Wt 65.1 kg (143 lb 8.3 oz)   LMP 2021   BMI 25.42 kg/m²   FHT: 130 BPM   Uterine Size: size equals dates        Assessment:      Pregnancy 36 and 1/7 weeks     Plan:     EDC 22 by LMP cw 7wk US  Growth 36wk, 27%, DANIA 14  Pap: 2019 NILM, plan fo PP pap  Vaccines: declines Flu, Tdap 22, No COVID  Desires genetic test wnl, boy  Ped: Dr. Bernstein  Epidural  Breast  Contraception: DepoProvera vs Mirena, ordered 22    Encounter for supervision of other normal pregnancy in third trimester  -     STREP B SCREEN, VAGINAL / RECTAL  -     CBC Auto Differential; Future; Expected date: 2022    36 weeks gestation of pregnancy          Follow up in 1 weeks.       I reviewed today her blood pressure, weight gain, urine results and  fetal heart rate.  I have reviewed the previous labs and ultrasound with the patient.   I have answered questions to her satisfaction and total of 20 minutes spend today

## 2022-08-12 LAB — BACTERIA SPEC AEROBE CULT: NORMAL

## 2022-08-17 ENCOUNTER — ROUTINE PRENATAL (OUTPATIENT)
Dept: OBSTETRICS AND GYNECOLOGY | Facility: CLINIC | Age: 27
End: 2022-08-17
Payer: MEDICAID

## 2022-08-17 VITALS
WEIGHT: 144.19 LBS | SYSTOLIC BLOOD PRESSURE: 124 MMHG | BODY MASS INDEX: 25.54 KG/M2 | DIASTOLIC BLOOD PRESSURE: 62 MMHG

## 2022-08-17 DIAGNOSIS — Z3A.37 37 WEEKS GESTATION OF PREGNANCY: ICD-10-CM

## 2022-08-17 DIAGNOSIS — Z34.83 ENCOUNTER FOR SUPERVISION OF OTHER NORMAL PREGNANCY IN THIRD TRIMESTER: Primary | ICD-10-CM

## 2022-08-17 LAB
BILIRUB SERPL-MCNC: NORMAL MG/DL
BLOOD URINE, POC: NORMAL
CLARITY, POC UA: CLEAR
COLOR, POC UA: NORMAL
GLUCOSE UR QL STRIP: NORMAL
KETONES UR QL STRIP: NORMAL
LEUKOCYTE ESTERASE URINE, POC: NORMAL
NITRITE, POC UA: NORMAL
PH, POC UA: 8
PROTEIN, POC: NORMAL
SPECIFIC GRAVITY, POC UA: 1.01
UROBILINOGEN, POC UA: NORMAL

## 2022-08-17 PROCEDURE — 99999 PR PBB SHADOW E&M-EST. PATIENT-LVL II: CPT | Mod: PBBFAC,,, | Performed by: OBSTETRICS & GYNECOLOGY

## 2022-08-17 PROCEDURE — 81002 URINALYSIS NONAUTO W/O SCOPE: CPT | Mod: PBBFAC,PN | Performed by: OBSTETRICS & GYNECOLOGY

## 2022-08-17 PROCEDURE — 99213 PR OFFICE/OUTPT VISIT, EST, LEVL III, 20-29 MIN: ICD-10-PCS | Mod: TH,S$PBB,, | Performed by: OBSTETRICS & GYNECOLOGY

## 2022-08-17 PROCEDURE — 99212 OFFICE O/P EST SF 10 MIN: CPT | Mod: PBBFAC,TH,PN | Performed by: OBSTETRICS & GYNECOLOGY

## 2022-08-17 PROCEDURE — 99213 OFFICE O/P EST LOW 20 MIN: CPT | Mod: TH,S$PBB,, | Performed by: OBSTETRICS & GYNECOLOGY

## 2022-08-17 PROCEDURE — 99999 PR PBB SHADOW E&M-EST. PATIENT-LVL II: ICD-10-PCS | Mod: PBBFAC,,, | Performed by: OBSTETRICS & GYNECOLOGY

## 2022-08-17 NOTE — PROGRESS NOTES
Subjective:      Hailey Menjivar is a 26 y.o. female being seen today for her obstetrical visit. She is at 37w2d gestation. Patient reports no complaints. Fetal movement: normal.    Menstrual History:  OB History        2    Para   1    Term   1       0    AB   0    Living   1       SAB   0    IAB   0    Ectopic   0    Multiple   0    Live Births   1                  Patient's last menstrual period was 2021.       OB ROS: no vaginal bleeding, no leakage of fluid, no contractions/cramping, no vaginal discharge. +fetal movement.      Objective:      /62   Wt 65.4 kg (144 lb 2.9 oz)   LMP 2021   BMI 25.54 kg/m²   FHT: 136 BPM   Uterine Size: size equals dates        Assessment:      Pregnancy 37 and 2/7 weeks     Plan:     EDC 22 by LMP cw 7wk US  GBS neg  Growth 36wk, 27%, DANIA 14  Pap: 2019 NILM, plan fo PP pap  Vaccines: declines Flu, Tdap 22, No COVID  Desires genetic test wnl, boy  Ped: Dr. Bernstein  Epidural  Breast  Contraception: DepoProvera vs Mirena, ordered 22    Encounter for supervision of other normal pregnancy in third trimester    37 weeks gestation of pregnancy  -     POCT URINE DIPSTICK WITHOUT MICROSCOPE          Follow up in 1 weeks.       I reviewed today her blood pressure, weight gain, urine results and  fetal heart rate.  I have reviewed the previous labs and ultrasound with the patient.   I have answered questions to her satisfaction and total of 20 minutes spend today

## 2022-08-26 ENCOUNTER — TELEPHONE (OUTPATIENT)
Dept: OBSTETRICS AND GYNECOLOGY | Facility: CLINIC | Age: 27
End: 2022-08-26
Payer: MEDICAID

## 2022-08-26 NOTE — TELEPHONE ENCOUNTER
----- Message from Jaimee Solo sent at 8/26/2022 10:49 AM CDT -----  Contact: pt  Type: Needs 2 week check up    Who Called: pt  Best Call Back Number: [phone]    Inquiry/Question: pt needs a 2 week check up       Thank you~

## 2022-09-06 ENCOUNTER — POSTPARTUM VISIT (OUTPATIENT)
Dept: OBSTETRICS AND GYNECOLOGY | Facility: CLINIC | Age: 27
End: 2022-09-06
Payer: MEDICAID

## 2022-09-06 ENCOUNTER — LAB VISIT (OUTPATIENT)
Dept: LAB | Facility: HOSPITAL | Age: 27
End: 2022-09-06
Attending: OBSTETRICS & GYNECOLOGY
Payer: MEDICAID

## 2022-09-06 VITALS — HEIGHT: 64 IN | BODY MASS INDEX: 22.06 KG/M2 | WEIGHT: 129.19 LBS

## 2022-09-06 DIAGNOSIS — D62 ACUTE BLOOD LOSS ANEMIA: Primary | ICD-10-CM

## 2022-09-06 DIAGNOSIS — D62 ACUTE BLOOD LOSS ANEMIA: ICD-10-CM

## 2022-09-06 LAB
FERRITIN SERPL-MCNC: 7 NG/ML (ref 20–300)
IRON SERPL-MCNC: 32 UG/DL (ref 30–160)
SATURATED IRON: 6 % (ref 20–50)
TOTAL IRON BINDING CAPACITY: 552 UG/DL (ref 250–450)
TRANSFERRIN SERPL-MCNC: 373 MG/DL (ref 200–375)

## 2022-09-06 PROCEDURE — 36415 COLL VENOUS BLD VENIPUNCTURE: CPT | Mod: PO | Performed by: OBSTETRICS & GYNECOLOGY

## 2022-09-06 PROCEDURE — 85025 COMPLETE CBC W/AUTO DIFF WBC: CPT | Performed by: OBSTETRICS & GYNECOLOGY

## 2022-09-06 PROCEDURE — 99213 PR OFFICE/OUTPT VISIT, EST, LEVL III, 20-29 MIN: ICD-10-PCS | Mod: S$PBB,24,TH, | Performed by: OBSTETRICS & GYNECOLOGY

## 2022-09-06 PROCEDURE — 84466 ASSAY OF TRANSFERRIN: CPT | Performed by: OBSTETRICS & GYNECOLOGY

## 2022-09-06 PROCEDURE — 99999 PR PBB SHADOW E&M-EST. PATIENT-LVL II: ICD-10-PCS | Mod: PBBFAC,,, | Performed by: OBSTETRICS & GYNECOLOGY

## 2022-09-06 PROCEDURE — 1111F PR DISCHARGE MEDS RECONCILED W/ CURRENT OUTPATIENT MED LIST: ICD-10-PCS | Mod: CPTII,,, | Performed by: OBSTETRICS & GYNECOLOGY

## 2022-09-06 PROCEDURE — 99212 OFFICE O/P EST SF 10 MIN: CPT | Mod: PBBFAC,PN | Performed by: OBSTETRICS & GYNECOLOGY

## 2022-09-06 PROCEDURE — 82728 ASSAY OF FERRITIN: CPT | Performed by: OBSTETRICS & GYNECOLOGY

## 2022-09-06 PROCEDURE — 99999 PR PBB SHADOW E&M-EST. PATIENT-LVL II: CPT | Mod: PBBFAC,,, | Performed by: OBSTETRICS & GYNECOLOGY

## 2022-09-06 PROCEDURE — 1111F DSCHRG MED/CURRENT MED MERGE: CPT | Mod: CPTII,,, | Performed by: OBSTETRICS & GYNECOLOGY

## 2022-09-06 PROCEDURE — 99213 OFFICE O/P EST LOW 20 MIN: CPT | Mod: S$PBB,24,TH, | Performed by: OBSTETRICS & GYNECOLOGY

## 2022-09-06 NOTE — PROGRESS NOTES
"26 y.o. here for 2 week postpartum exam without complaint.   26 y.o. female  at 38w1d admitted due to labor.   Normal spontaneous vaginal delivery of live infant  midline superior periurtheral repaired with figure of eight stitch. 3-0 Chromic on SH.  PP course complicated by bleeding and syncopal episode. She is s/p cytotec 100mcg PO.  Acute blood loss anemia. Asymptomatic. H/H at discharge , declined transfusion.     She has no mood complaints.   Minimal lochia.   No pain.  Breast feeding    Contraception: Depo Provera, will start at next visit    Last pap: Pap: 2019 NILM, plan fo PP pap    PE:  Ht 5' 4" (1.626 m)   Wt 58.6 kg (129 lb 3 oz)   LMP 2021   BMI 22.18 kg/m²   Body mass index is 22.18 kg/m².  Constitutional: She appears well-developed and well-nourished. No distress.   Breasts: deferred  Abdominal: Soft. She exhibits no distension and no mass. There is no tenderness. There is no rebound and no guarding.   Genitourinary: OBGyn Exam deferred  Psychiatric: She has a normal mood and affect.    Assessment:  2 week interval PP exam, doing well    Acute blood loss anemia  -     CBC Auto Differential; Future; Expected date: 2022  -     Ferritin; Future; Expected date: 2022  -     Iron and TIBC; Future; Expected date: 2022    Plan:  Follow up 4 weeks  Contraception: DepoProvera  Next pap at next visit    "

## 2022-09-07 LAB
BASOPHILS # BLD AUTO: 0.04 K/UL (ref 0–0.2)
BASOPHILS NFR BLD: 0.6 % (ref 0–1.9)
DIFFERENTIAL METHOD: ABNORMAL
EOSINOPHIL # BLD AUTO: 0.1 K/UL (ref 0–0.5)
EOSINOPHIL NFR BLD: 0.7 % (ref 0–8)
ERYTHROCYTE [DISTWIDTH] IN BLOOD BY AUTOMATED COUNT: 15.1 % (ref 11.5–14.5)
HCT VFR BLD AUTO: 24 % (ref 37–48.5)
HGB BLD-MCNC: 7.4 G/DL (ref 12–16)
IMM GRANULOCYTES # BLD AUTO: 0.03 K/UL (ref 0–0.04)
IMM GRANULOCYTES NFR BLD AUTO: 0.4 % (ref 0–0.5)
LYMPHOCYTES # BLD AUTO: 2.9 K/UL (ref 1–4.8)
LYMPHOCYTES NFR BLD: 40.4 % (ref 18–48)
MCH RBC QN AUTO: 27.5 PG (ref 27–31)
MCHC RBC AUTO-ENTMCNC: 30.8 G/DL (ref 32–36)
MCV RBC AUTO: 89 FL (ref 82–98)
MONOCYTES # BLD AUTO: 0.4 K/UL (ref 0.3–1)
MONOCYTES NFR BLD: 6.1 % (ref 4–15)
NEUTROPHILS # BLD AUTO: 3.7 K/UL (ref 1.8–7.7)
NEUTROPHILS NFR BLD: 51.8 % (ref 38–73)
NRBC BLD-RTO: 0 /100 WBC
PLATELET # BLD AUTO: 440 K/UL (ref 150–450)
PMV BLD AUTO: 9.9 FL (ref 9.2–12.9)
RBC # BLD AUTO: 2.69 M/UL (ref 4–5.4)
WBC # BLD AUTO: 7.21 K/UL (ref 3.9–12.7)

## 2022-09-08 ENCOUNTER — TELEPHONE (OUTPATIENT)
Dept: OBSTETRICS AND GYNECOLOGY | Facility: CLINIC | Age: 27
End: 2022-09-08
Payer: MEDICAID

## 2022-09-08 DIAGNOSIS — D50.8 OTHER IRON DEFICIENCY ANEMIA: Primary | ICD-10-CM

## 2022-09-08 NOTE — TELEPHONE ENCOUNTER
----- Message from Tiki Perez sent at 9/8/2022  1:58 PM CDT -----  Contact: patient  Type:  Needs Medical Advice    Who Called:  Patient      Would the patient rather a call back or a response via [facility]?  Call    Best Call Back Number:  [phone] (home)     Additional Information:  patient needs to speak to the nurse to see if the office sent off her paperwork for her job and to discuss the blood test results     Please call to advise

## 2022-09-09 ENCOUNTER — TELEPHONE (OUTPATIENT)
Dept: HEMATOLOGY/ONCOLOGY | Facility: CLINIC | Age: 27
End: 2022-09-09
Payer: MEDICAID

## 2022-09-09 NOTE — TELEPHONE ENCOUNTER
Called pt and discussed hem referral.  Pt stated she resides in [place], and scheduled benign hem appt.   Confirmed appt date time and location w/ pt.

## 2022-09-13 ENCOUNTER — TELEPHONE (OUTPATIENT)
Dept: OBSTETRICS AND GYNECOLOGY | Facility: CLINIC | Age: 27
End: 2022-09-13
Payer: MEDICAID

## 2022-09-13 NOTE — TELEPHONE ENCOUNTER
S/w patient. Will callback with update of how long maternity leave will be to submit [facility] paperwork to employer

## 2022-09-13 NOTE — TELEPHONE ENCOUNTER
----- Message from Sabiha Garcia sent at 9/13/2022  1:15 PM CDT -----  Contact: pt @ [phone]  Type: Needs Medical Advice  Who Called:  pt  Best Call Back Number: [phone]   Additional Information: Maternity leave paperwork questions. Please call pt to advise

## 2022-09-14 ENCOUNTER — TELEPHONE (OUTPATIENT)
Dept: OBSTETRICS AND GYNECOLOGY | Facility: CLINIC | Age: 27
End: 2022-09-14
Payer: MEDICAID

## 2022-09-14 NOTE — TELEPHONE ENCOUNTER
----- Message from Marti Alvarado sent at 9/14/2022  1:14 PM CDT -----  Contact: pt at [phone]  Type: Needs Medical Advice  Who Called:  pt   Best Call Back Number: [phone]    Additional Information: pt called in to see about getting her maternity paper work completed please call back to advise

## 2022-09-16 ENCOUNTER — OFFICE VISIT (OUTPATIENT)
Dept: HEMATOLOGY/ONCOLOGY | Facility: CLINIC | Age: 27
End: 2022-09-16
Payer: MEDICAID

## 2022-09-16 ENCOUNTER — LAB VISIT (OUTPATIENT)
Dept: LAB | Facility: HOSPITAL | Age: 27
End: 2022-09-16
Attending: INTERNAL MEDICINE
Payer: MEDICAID

## 2022-09-16 VITALS
RESPIRATION RATE: 18 BRPM | DIASTOLIC BLOOD PRESSURE: 80 MMHG | HEIGHT: 64 IN | HEART RATE: 97 BPM | OXYGEN SATURATION: 99 % | WEIGHT: 128.31 LBS | TEMPERATURE: 98 F | SYSTOLIC BLOOD PRESSURE: 126 MMHG | BODY MASS INDEX: 21.91 KG/M2

## 2022-09-16 DIAGNOSIS — D50.8 OTHER IRON DEFICIENCY ANEMIA: ICD-10-CM

## 2022-09-16 DIAGNOSIS — D64.9 ANEMIA, UNSPECIFIED TYPE: ICD-10-CM

## 2022-09-16 PROBLEM — D50.9 IRON (FE) DEFICIENCY ANEMIA: Status: ACTIVE | Noted: 2022-09-16

## 2022-09-16 LAB
BASOPHILS # BLD AUTO: 0.04 K/UL (ref 0–0.2)
BASOPHILS NFR BLD: 0.7 % (ref 0–1.9)
DIFFERENTIAL METHOD: ABNORMAL
EOSINOPHIL # BLD AUTO: 0.1 K/UL (ref 0–0.5)
EOSINOPHIL NFR BLD: 1.3 % (ref 0–8)
ERYTHROCYTE [DISTWIDTH] IN BLOOD BY AUTOMATED COUNT: 14.4 % (ref 11.5–14.5)
HCT VFR BLD AUTO: 27.1 % (ref 37–48.5)
HGB BLD-MCNC: 8.3 G/DL (ref 12–16)
IMM GRANULOCYTES # BLD AUTO: 0.04 K/UL (ref 0–0.04)
IMM GRANULOCYTES NFR BLD AUTO: 0.7 % (ref 0–0.5)
LYMPHOCYTES # BLD AUTO: 2.5 K/UL (ref 1–4.8)
LYMPHOCYTES NFR BLD: 41 % (ref 18–48)
MCH RBC QN AUTO: 25.7 PG (ref 27–31)
MCHC RBC AUTO-ENTMCNC: 30.6 G/DL (ref 32–36)
MCV RBC AUTO: 84 FL (ref 82–98)
MONOCYTES # BLD AUTO: 0.4 K/UL (ref 0.3–1)
MONOCYTES NFR BLD: 6.4 % (ref 4–15)
NEUTROPHILS # BLD AUTO: 3.1 K/UL (ref 1.8–7.7)
NEUTROPHILS NFR BLD: 49.9 % (ref 38–73)
NRBC BLD-RTO: 0 /100 WBC
PLATELET # BLD AUTO: 377 K/UL (ref 150–450)
PMV BLD AUTO: 8.6 FL (ref 9.2–12.9)
RBC # BLD AUTO: 3.23 M/UL (ref 4–5.4)
WBC # BLD AUTO: 6.12 K/UL (ref 3.9–12.7)

## 2022-09-16 PROCEDURE — 3079F DIAST BP 80-89 MM HG: CPT | Mod: CPTII,,, | Performed by: INTERNAL MEDICINE

## 2022-09-16 PROCEDURE — 99999 PR PBB SHADOW E&M-EST. PATIENT-LVL III: ICD-10-PCS | Mod: PBBFAC,,, | Performed by: INTERNAL MEDICINE

## 2022-09-16 PROCEDURE — 36415 COLL VENOUS BLD VENIPUNCTURE: CPT | Mod: PN | Performed by: INTERNAL MEDICINE

## 2022-09-16 PROCEDURE — 3008F BODY MASS INDEX DOCD: CPT | Mod: CPTII,,, | Performed by: INTERNAL MEDICINE

## 2022-09-16 PROCEDURE — 3074F PR MOST RECENT SYSTOLIC BLOOD PRESSURE < 130 MM HG: ICD-10-PCS | Mod: CPTII,,, | Performed by: INTERNAL MEDICINE

## 2022-09-16 PROCEDURE — 1159F PR MEDICATION LIST DOCUMENTED IN MEDICAL RECORD: ICD-10-PCS | Mod: CPTII,,, | Performed by: INTERNAL MEDICINE

## 2022-09-16 PROCEDURE — 99999 PR PBB SHADOW E&M-EST. PATIENT-LVL III: CPT | Mod: PBBFAC,,, | Performed by: INTERNAL MEDICINE

## 2022-09-16 PROCEDURE — 3008F PR BODY MASS INDEX (BMI) DOCUMENTED: ICD-10-PCS | Mod: CPTII,,, | Performed by: INTERNAL MEDICINE

## 2022-09-16 PROCEDURE — 1159F MED LIST DOCD IN RCRD: CPT | Mod: CPTII,,, | Performed by: INTERNAL MEDICINE

## 2022-09-16 PROCEDURE — 99205 OFFICE O/P NEW HI 60 MIN: CPT | Mod: S$PBB,,, | Performed by: INTERNAL MEDICINE

## 2022-09-16 PROCEDURE — 1111F DSCHRG MED/CURRENT MED MERGE: CPT | Mod: CPTII,,, | Performed by: INTERNAL MEDICINE

## 2022-09-16 PROCEDURE — 99205 PR OFFICE/OUTPT VISIT, NEW, LEVL V, 60-74 MIN: ICD-10-PCS | Mod: S$PBB,,, | Performed by: INTERNAL MEDICINE

## 2022-09-16 PROCEDURE — 3074F SYST BP LT 130 MM HG: CPT | Mod: CPTII,,, | Performed by: INTERNAL MEDICINE

## 2022-09-16 PROCEDURE — 1111F PR DISCHARGE MEDS RECONCILED W/ CURRENT OUTPATIENT MED LIST: ICD-10-PCS | Mod: CPTII,,, | Performed by: INTERNAL MEDICINE

## 2022-09-16 PROCEDURE — 99213 OFFICE O/P EST LOW 20 MIN: CPT | Mod: PBBFAC,PN | Performed by: INTERNAL MEDICINE

## 2022-09-16 PROCEDURE — 3079F PR MOST RECENT DIASTOLIC BLOOD PRESSURE 80-89 MM HG: ICD-10-PCS | Mod: CPTII,,, | Performed by: INTERNAL MEDICINE

## 2022-09-16 PROCEDURE — 85025 COMPLETE CBC W/AUTO DIFF WBC: CPT | Mod: PN | Performed by: INTERNAL MEDICINE

## 2022-09-16 NOTE — PROGRESS NOTES
Subjective:       Patient ID: Hailey Menjivar is a 26 y.o. female.    Chief Complaint: No chief complaint on file.    HPI      Mrs Hernández is a 27-year-old  female referred for evaluation for iron deficiency anemia.  Her most recent CBC is from 2022 and shows a white count of 7,200 per cubic mm, hemoglobin 7.4 grams/dL, hematocrit 24%, MCV 89 and platelets 440,000 per cubic mm.  Of note, on on 2022 she delivered a healthy child through a vaginal delivery.  However, the next day, she had a severe bleeding episode that resulted in a syncopal episode.  I have reviewed Dr. Hilliard's notes    On 2022, her WBCs were 9,000 per cubic mm, hemoglobin was 5.9 grams/dL, hematocrit was 17.7%, MCV was 85, and platelets 191,000 per cubic mm.  Finally, on 2022 her WBCs were 9700 per cubic mm, hemoglobin 10.4 grams/deciliter, hematocrit 31.9%, MCV 85 and platelets 210,000 per cubic mm.  Her ferritin was 7 ng/mL on 2022.    She was started on oral iron replacement therapy twice a day on 2022, and she has been compliant with her treatment.      PAST MEDICAL HISTORY:  Unremarkable  PAST SURGICAL HISTORY: tonsillectomy  SOCIAL HISTORY:  She works at a bank.  She is single.  She does not smoke and does not drink alcohol  FAMILY HISTORY:  Negative for cancer  GYN HISTORY:  She is .  She had a vaginal delivery on , however, a day later she had a significant vaginal bleeding episode.  She was started on iron supplements on 2022.    Review of Systems    Overall she feels well.  She complains of fatigue.  She also admits to [facility] for ice.  ECOG PS is 1.  She denies any anxiety, depression, easy bruising, fevers, chills, night  sweats, weight loss, nausea, vomiting, diarrhea, constipation, diplopia, blurred vision, headache, chest pain, palpitations, shortness of breath, breast pain, abdominal pain, extremity pain, or difficulty ambulating.  The remainder of the ten-point  ROS, including general, skin, lymph, H/N, cardiorespiratory, GI, , Neuro, Endocrine, and psychiatric is negative.    Objective:      Physical Exam    She is alert, oriented to time, place, person, pleasant, well      nourished, in no acute physical distress.  She is here with her mother.                                  VITAL SIGNS:  Reviewed                                      HEENT:  Normal.  There are no nasal, oral, lip, gingival, auricular, lid,    or conjunctival lesions.  Mucosae are moist and pink, and there is no        thrush.  Pupils are equal, reactive to light and accommodation.              Extraocular muscle movements are intact.  Dentition is good.  There is no frontal or maxillary tenderness.                                     NECK:  Supple without JVD, adenopathy, or thyromegaly.                       LUNGS:  Clear to auscultation without wheezing, rales, or rhonchi.           CARDIOVASCULAR:  Reveals an S1, S2, no murmurs, no rubs, no gallops.         ABDOMEN:  Soft, nontender, without organomegaly.  Bowel sounds are    present.                                                                     EXTREMITIES:  No cyanosis, clubbing, or edema.                               BREASTS:  Deferred                                    LYMPHATIC:  There is no cervical, axillary, or supraclavicular adenopathy.   SKIN:  Warm and moist, without petechiae, rashes, induration, or ecchymoses.           NEUROLOGIC:  DTRs are 0-1+ bilaterally, symmetrical, motor function is 5/5,  and cranial nerves are  within normal limits.   Assessment:     1. Anemia with a low ferritin, most likely secondary to her pregnancy and her bleeding episode post delivery.  The acuteness of her bleeding probably explains why her MCV is not low     Plan:       I had a long discussion with her and her mother.  At this point we will proceed as follows:  She will have a repeat CBC today.    She will submit 3 stool samples for occult blood  testing  She will submit a urine sample for urinalysis  She will remain on oral iron supplementation therapy for now    If the H&H continues to improve, I will see her again in a month with a repeat CBC and a repeat ferritin.  Her multiple questions were answered to her satisfaction.  I spent approximately 60 minutes reviewing the available records and evaluating the patient, out of which over 50% of the time was spent face to face with the patient in counseling and coordinating this patient's care.     1:04 p.m. ADDENDUM  Her CBC today shows a white count of 6,100 per cubic mm, hemoglobin 8.3 grams/deciliter, hematocrit 27.1%, and platelets 271,000 per cubic mm.  She will remain on oral iron supplementation therapy and see me in a month with repeat CBC and a repeat ferritin.

## 2022-10-05 ENCOUNTER — POSTPARTUM VISIT (OUTPATIENT)
Dept: OBSTETRICS AND GYNECOLOGY | Facility: CLINIC | Age: 27
End: 2022-10-05
Payer: MEDICAID

## 2022-10-05 VITALS
SYSTOLIC BLOOD PRESSURE: 116 MMHG | DIASTOLIC BLOOD PRESSURE: 70 MMHG | HEIGHT: 64 IN | BODY MASS INDEX: 22.65 KG/M2 | WEIGHT: 132.69 LBS

## 2022-10-05 DIAGNOSIS — Z12.4 SCREENING FOR CERVICAL CANCER: ICD-10-CM

## 2022-10-05 DIAGNOSIS — Z30.019 ENCOUNTER FOR FEMALE BIRTH CONTROL: ICD-10-CM

## 2022-10-05 DIAGNOSIS — N88.9 CERVICAL LESION: ICD-10-CM

## 2022-10-05 DIAGNOSIS — D50.8 OTHER IRON DEFICIENCY ANEMIA: ICD-10-CM

## 2022-10-05 PROCEDURE — 99999 PR PBB SHADOW E&M-EST. PATIENT-LVL III: CPT | Mod: PBBFAC,,, | Performed by: OBSTETRICS & GYNECOLOGY

## 2022-10-05 PROCEDURE — 99999 PR PBB SHADOW E&M-EST. PATIENT-LVL III: ICD-10-PCS | Mod: PBBFAC,,, | Performed by: OBSTETRICS & GYNECOLOGY

## 2022-10-05 PROCEDURE — 88175 CYTOPATH C/V AUTO FLUID REDO: CPT | Performed by: OBSTETRICS & GYNECOLOGY

## 2022-10-05 PROCEDURE — 99213 OFFICE O/P EST LOW 20 MIN: CPT | Mod: PBBFAC,PN | Performed by: OBSTETRICS & GYNECOLOGY

## 2022-10-05 PROCEDURE — 99213 OFFICE O/P EST LOW 20 MIN: CPT | Mod: S$PBB,,, | Performed by: OBSTETRICS & GYNECOLOGY

## 2022-10-05 PROCEDURE — 96372 THER/PROPH/DIAG INJ SC/IM: CPT | Mod: PBBFAC,PN

## 2022-10-05 PROCEDURE — 99213 PR OFFICE/OUTPT VISIT, EST, LEVL III, 20-29 MIN: ICD-10-PCS | Mod: S$PBB,,, | Performed by: OBSTETRICS & GYNECOLOGY

## 2022-10-05 RX ORDER — MEDROXYPROGESTERONE ACETATE 150 MG/ML
150 INJECTION, SUSPENSION INTRAMUSCULAR
Status: SHIPPED | OUTPATIENT
Start: 2022-10-05

## 2022-10-05 RX ADMIN — MEDROXYPROGESTERONE ACETATE 150 MG: 150 INJECTION, SUSPENSION INTRAMUSCULAR at 03:10

## 2022-10-05 NOTE — LETTER
October 5, 2022      [facility]  [address] [address] [address]  Phone: [phone]  Fax: [phone]       Patient: Hailey Menjivar   YOB: 1995  Date of Visit: 10/05/2022    To Whom It May Concern:    Sarthak Menjivar  was at [facility] on 10/05/2022. The patient may return to work on October 17, 2022 with no restrictions. If you have any questions or concerns, or if I can be of further assistance, please do not hesitate to contact me.    Sincerely,    Sweetie Hilliard MD

## 2022-10-12 ENCOUNTER — PATIENT MESSAGE (OUTPATIENT)
Dept: OBSTETRICS AND GYNECOLOGY | Facility: CLINIC | Age: 27
End: 2022-10-12
Payer: MEDICAID

## 2022-10-12 LAB
FINAL PATHOLOGIC DIAGNOSIS: NORMAL
Lab: NORMAL

## 2022-10-13 ENCOUNTER — PATIENT MESSAGE (OUTPATIENT)
Dept: HEMATOLOGY/ONCOLOGY | Facility: CLINIC | Age: 27
End: 2022-10-13
Payer: MEDICAID

## 2022-11-02 ENCOUNTER — PATIENT MESSAGE (OUTPATIENT)
Dept: OBSTETRICS AND GYNECOLOGY | Facility: CLINIC | Age: 27
End: 2022-11-02

## 2022-11-02 ENCOUNTER — OFFICE VISIT (OUTPATIENT)
Dept: OBSTETRICS AND GYNECOLOGY | Facility: CLINIC | Age: 27
End: 2022-11-02
Payer: MEDICAID

## 2022-11-02 ENCOUNTER — TELEPHONE (OUTPATIENT)
Dept: OBSTETRICS AND GYNECOLOGY | Facility: CLINIC | Age: 27
End: 2022-11-02
Payer: MEDICAID

## 2022-11-02 VITALS — SYSTOLIC BLOOD PRESSURE: 126 MMHG | WEIGHT: 136 LBS | BODY MASS INDEX: 23.35 KG/M2 | DIASTOLIC BLOOD PRESSURE: 72 MMHG

## 2022-11-02 DIAGNOSIS — B96.89 BV (BACTERIAL VAGINOSIS): Primary | ICD-10-CM

## 2022-11-02 DIAGNOSIS — N76.0 BV (BACTERIAL VAGINOSIS): Primary | ICD-10-CM

## 2022-11-02 DIAGNOSIS — N88.9 CERVICAL LESION: ICD-10-CM

## 2022-11-02 DIAGNOSIS — N91.2 ABSENT MENSES: ICD-10-CM

## 2022-11-02 LAB
B-HCG UR QL: NEGATIVE
CTP QC/QA: YES

## 2022-11-02 PROCEDURE — 3078F DIAST BP <80 MM HG: CPT | Mod: CPTII,,, | Performed by: OBSTETRICS & GYNECOLOGY

## 2022-11-02 PROCEDURE — 1159F MED LIST DOCD IN RCRD: CPT | Mod: CPTII,,, | Performed by: OBSTETRICS & GYNECOLOGY

## 2022-11-02 PROCEDURE — 57455 BIOPSY OF CERVIX W/SCOPE: CPT | Mod: PBBFAC,PN | Performed by: OBSTETRICS & GYNECOLOGY

## 2022-11-02 PROCEDURE — 99999 PR PBB SHADOW E&M-EST. PATIENT-LVL II: CPT | Mod: PBBFAC,,, | Performed by: OBSTETRICS & GYNECOLOGY

## 2022-11-02 PROCEDURE — 57455 PR COLPOSCOPY,CERVIX W/ADJ VAGINA,W/BX: ICD-10-PCS | Mod: S$PBB,,, | Performed by: OBSTETRICS & GYNECOLOGY

## 2022-11-02 PROCEDURE — 57455 BIOPSY OF CERVIX W/SCOPE: CPT | Mod: S$PBB,,, | Performed by: OBSTETRICS & GYNECOLOGY

## 2022-11-02 PROCEDURE — 3074F SYST BP LT 130 MM HG: CPT | Mod: CPTII,,, | Performed by: OBSTETRICS & GYNECOLOGY

## 2022-11-02 PROCEDURE — 99499 UNLISTED E&M SERVICE: CPT | Mod: S$PBB,,, | Performed by: OBSTETRICS & GYNECOLOGY

## 2022-11-02 PROCEDURE — 81025 URINE PREGNANCY TEST: CPT | Mod: PBBFAC,PN | Performed by: OBSTETRICS & GYNECOLOGY

## 2022-11-02 PROCEDURE — 1159F PR MEDICATION LIST DOCUMENTED IN MEDICAL RECORD: ICD-10-PCS | Mod: CPTII,,, | Performed by: OBSTETRICS & GYNECOLOGY

## 2022-11-02 PROCEDURE — 3008F BODY MASS INDEX DOCD: CPT | Mod: CPTII,,, | Performed by: OBSTETRICS & GYNECOLOGY

## 2022-11-02 PROCEDURE — 3078F PR MOST RECENT DIASTOLIC BLOOD PRESSURE < 80 MM HG: ICD-10-PCS | Mod: CPTII,,, | Performed by: OBSTETRICS & GYNECOLOGY

## 2022-11-02 PROCEDURE — 99499 NO LOS: ICD-10-PCS | Mod: S$PBB,,, | Performed by: OBSTETRICS & GYNECOLOGY

## 2022-11-02 PROCEDURE — 3008F PR BODY MASS INDEX (BMI) DOCUMENTED: ICD-10-PCS | Mod: CPTII,,, | Performed by: OBSTETRICS & GYNECOLOGY

## 2022-11-02 PROCEDURE — 88305 TISSUE EXAM BY PATHOLOGIST: CPT | Mod: 26,,, | Performed by: STUDENT IN AN ORGANIZED HEALTH CARE EDUCATION/TRAINING PROGRAM

## 2022-11-02 PROCEDURE — 88305 TISSUE EXAM BY PATHOLOGIST: ICD-10-PCS | Mod: 26,,, | Performed by: STUDENT IN AN ORGANIZED HEALTH CARE EDUCATION/TRAINING PROGRAM

## 2022-11-02 PROCEDURE — 99999 PR PBB SHADOW E&M-EST. PATIENT-LVL II: ICD-10-PCS | Mod: PBBFAC,,, | Performed by: OBSTETRICS & GYNECOLOGY

## 2022-11-02 PROCEDURE — 88305 TISSUE EXAM BY PATHOLOGIST: CPT | Performed by: STUDENT IN AN ORGANIZED HEALTH CARE EDUCATION/TRAINING PROGRAM

## 2022-11-02 PROCEDURE — 99212 OFFICE O/P EST SF 10 MIN: CPT | Mod: PBBFAC,PN,25 | Performed by: OBSTETRICS & GYNECOLOGY

## 2022-11-02 PROCEDURE — 3074F PR MOST RECENT SYSTOLIC BLOOD PRESSURE < 130 MM HG: ICD-10-PCS | Mod: CPTII,,, | Performed by: OBSTETRICS & GYNECOLOGY

## 2022-11-02 RX ORDER — METRONIDAZOLE 500 MG/1
500 TABLET ORAL EVERY 12 HOURS
Qty: 14 TABLET | Refills: 0 | Status: SHIPPED | OUTPATIENT
Start: 2022-11-02 | End: 2022-11-09

## 2022-11-02 NOTE — TELEPHONE ENCOUNTER
----- Message from Gladis Micheline sent at 11/2/2022  2:53 PM CDT -----  Contact: self  Type: Needs Medical Advice    Who Called:  patient  Symptoms (please be specific):  med    Pharmacy name and phone #:    [id number] - [place] - [address] [facility] [address]  [address] [address]  Phone: [phone] Fax: [phone]     Best Call Back Number: [phone]   Additional Information: The pt stated that she saw Dr. Hilliard today. The pt stated that Dr. Hilliard was going to prescribe a medication for vaginal melanosis. The pt stated that she went to the pharmacy to pick it up the medication but they have not received anything from the office. Please call pt back and advice. Thanks.

## 2022-11-02 NOTE — PROCEDURES
Hailey Menjivar is a 26 y.o. female patient.    BP: 126/72 (11/02/22 1307)  Weight: 61.7 kg (136 lb 0.4 oz) (11/02/22 1307)       Colposcopy    Date/Time: 11/2/2022 1:00 PM  Performed by: Sweetie Hilliard MD  Authorized by: Sweetie Hilliard MD     Consent Done?:  Yes (Verbal) and Yes (Written)  Timeout:Immediately prior to procedure a time out was called to verify the correct patient, procedure, equipment, support staff and site/side marked as required  Assistants?: Yes      Colposcopy Site:  Cervix  Position:  Supine  Acrowhite Lesion: No    Atypical Vessels: No    Transformation Zone Adequate?: Yes    Biopsy?: Yes         Location:  Cervix ((5 00))  ECC Performed?: No    LEEP Performed?: No    Estimated blood loss (cc):  2   Patient tolerated the procedure well with no immediate complications.   Post-operative instructions were provided for the patient.   Patient was discharged and will follow up if any complications occur    11/2/2022    Physical Exam:               Genitourinary:

## 2022-11-14 LAB
FINAL PATHOLOGIC DIAGNOSIS: NORMAL
GROSS: NORMAL
Lab: NORMAL